# Patient Record
Sex: MALE | Race: WHITE | NOT HISPANIC OR LATINO | Employment: OTHER | URBAN - METROPOLITAN AREA
[De-identification: names, ages, dates, MRNs, and addresses within clinical notes are randomized per-mention and may not be internally consistent; named-entity substitution may affect disease eponyms.]

---

## 2023-09-12 ENCOUNTER — OFFICE VISIT (OUTPATIENT)
Dept: PRIMARY CARE | Facility: CLINIC | Age: 68
End: 2023-09-12
Payer: MEDICARE

## 2023-09-12 VITALS
HEART RATE: 87 BPM | TEMPERATURE: 97.3 F | SYSTOLIC BLOOD PRESSURE: 146 MMHG | HEIGHT: 63 IN | BODY MASS INDEX: 21.09 KG/M2 | DIASTOLIC BLOOD PRESSURE: 77 MMHG | WEIGHT: 119 LBS

## 2023-09-12 DIAGNOSIS — L89.312 PRESSURE INJURY OF RIGHT BUTTOCK, STAGE 2 (MULTI): Primary | ICD-10-CM

## 2023-09-12 PROCEDURE — 99213 OFFICE O/P EST LOW 20 MIN: CPT | Performed by: INTERNAL MEDICINE

## 2023-09-12 PROCEDURE — 1126F AMNT PAIN NOTED NONE PRSNT: CPT | Performed by: INTERNAL MEDICINE

## 2023-09-12 NOTE — PROGRESS NOTES
"Subjective   Raghu Gonzalez is a 68 y.o. male who presents for No chief complaint on file..  HPI  Patient presents accompanied by his wife with concerns about a possible bedsore.  He had been having discomfort in the gluteal cleft for several weeks, pain when sitting down or changing positions.  Finally told his wife and she saw it looked like a sore on the right side of the buttocks about 3 days ago.  Since then he has tried using a special cushion to keep changing positions and offloading and that has seemed to help with the discomfort.  He has not had any blood or drainage, no systemic symptoms of infection.  Objective   /77   Pulse 87   Temp 36.3 °C (97.3 °F)   Ht 1.6 m (5' 3\")   Wt 54 kg (119 lb)   BMI 21.08 kg/m²    Physical Exam  NAD  On the right side of the gluteal cleft more superiorly there is a ovoid ulcer, stage II, proximally 1-1/2 x 0.5 cm.  No surrounding erythema induration warmth or drainage.      Assessment/Plan   Problem List Items Addressed This Visit    None  Visit Diagnoses       Pressure injury of right buttock, stage 2 (CMS/Formerly [facility])    -  Primary          -Protection and offloading       Hair George MD   "

## 2023-09-12 NOTE — PATIENT INSTRUCTIONS
Wash twice daily with soap and water and whenever the area is soiled.  Apply an ointment such as A&D after washing.  If needed, use an occlusive bandage like Mepilex to protect the area.  Change positions often.  Call if there are any concerns.

## 2023-10-19 ENCOUNTER — TELEPHONE (OUTPATIENT)
Dept: NEUROLOGY | Facility: CLINIC | Age: 68
End: 2023-10-19
Payer: MEDICARE

## 2023-10-19 DIAGNOSIS — G47.19 EXCESSIVE DAYTIME SLEEPINESS: Primary | ICD-10-CM

## 2023-10-19 RX ORDER — LORATADINE 10 MG/1
TABLET ORAL
COMMUNITY

## 2023-10-19 RX ORDER — SIMVASTATIN 40 MG/1
40 TABLET, FILM COATED ORAL EVERY OTHER DAY
COMMUNITY
End: 2023-11-27 | Stop reason: DRUGHIGH

## 2023-10-19 RX ORDER — IBUPROFEN 600 MG/1
TABLET ORAL 3 TIMES DAILY PRN
COMMUNITY

## 2023-10-19 RX ORDER — SIMVASTATIN 10 MG/1
TABLET, FILM COATED ORAL
COMMUNITY
End: 2023-11-27 | Stop reason: WASHOUT

## 2023-10-19 RX ORDER — BUPROPION HYDROCHLORIDE 300 MG/1
300 TABLET ORAL DAILY
COMMUNITY
End: 2023-11-30 | Stop reason: SDUPTHER

## 2023-10-19 RX ORDER — ATENOLOL 50 MG/1
50 TABLET ORAL DAILY
COMMUNITY
End: 2023-11-27 | Stop reason: SDUPTHER

## 2023-10-19 RX ORDER — CARBIDOPA AND LEVODOPA 25; 100 MG/1; MG/1
1 TABLET, EXTENDED RELEASE ORAL 4 TIMES DAILY
COMMUNITY

## 2023-10-19 RX ORDER — BISACODYL 5 MG
1 TABLET, DELAYED RELEASE (ENTERIC COATED) ORAL DAILY
COMMUNITY
Start: 2022-10-07

## 2023-10-19 RX ORDER — HYDROXYZINE PAMOATE 25 MG/1
25 CAPSULE ORAL AS NEEDED
COMMUNITY
End: 2023-11-30 | Stop reason: ALTCHOICE

## 2023-10-19 RX ORDER — CARBIDOPA AND LEVODOPA 25; 100 MG/1; MG/1
TABLET ORAL 4 TIMES DAILY
COMMUNITY
Start: 2022-03-08 | End: 2023-11-30 | Stop reason: ALTCHOICE

## 2023-10-19 RX ORDER — TRAZODONE HYDROCHLORIDE 50 MG/1
50 TABLET ORAL NIGHTLY PRN
COMMUNITY
End: 2023-11-30 | Stop reason: SDUPTHER

## 2023-10-19 RX ORDER — ROPINIROLE 8 MG/1
8 TABLET, EXTENDED RELEASE ORAL 2 TIMES DAILY
COMMUNITY
End: 2023-11-27 | Stop reason: WASHOUT

## 2023-10-19 RX ORDER — BUPROPION HYDROCHLORIDE 300 MG/1
300 TABLET ORAL
COMMUNITY
End: 2023-11-30 | Stop reason: SDUPTHER

## 2023-10-19 RX ORDER — SERTRALINE HYDROCHLORIDE 100 MG/1
100 TABLET, FILM COATED ORAL
COMMUNITY
End: 2023-11-30 | Stop reason: SDUPTHER

## 2023-10-19 RX ORDER — MODAFINIL 100 MG/1
200 TABLET ORAL DAILY
COMMUNITY
End: 2023-10-19 | Stop reason: SDUPTHER

## 2023-10-19 RX ORDER — MODAFINIL 100 MG/1
100 TABLET ORAL 2 TIMES DAILY
Qty: 60 TABLET | Refills: 2 | Status: SHIPPED | OUTPATIENT
Start: 2023-10-19 | End: 2023-11-22 | Stop reason: SDUPTHER

## 2023-10-19 NOTE — TELEPHONE ENCOUNTER
Patient called requesting a refill of Modafinil 100 mg sent to [facility] on [address]. Thank you

## 2023-11-22 DIAGNOSIS — G47.19 EXCESSIVE DAYTIME SLEEPINESS: ICD-10-CM

## 2023-11-27 ENCOUNTER — OFFICE VISIT (OUTPATIENT)
Dept: PRIMARY CARE | Facility: CLINIC | Age: 68
End: 2023-11-27
Payer: MEDICARE

## 2023-11-27 VITALS
WEIGHT: 118 LBS | DIASTOLIC BLOOD PRESSURE: 79 MMHG | OXYGEN SATURATION: 96 % | TEMPERATURE: 97.3 F | SYSTOLIC BLOOD PRESSURE: 137 MMHG | HEART RATE: 87 BPM | BODY MASS INDEX: 20.91 KG/M2 | HEIGHT: 63 IN

## 2023-11-27 DIAGNOSIS — Z87.891 PERSONAL HISTORY OF TOBACCO USE, PRESENTING HAZARDS TO HEALTH: ICD-10-CM

## 2023-11-27 DIAGNOSIS — E46 PROTEIN-CALORIE MALNUTRITION, UNSPECIFIED SEVERITY (MULTI): ICD-10-CM

## 2023-11-27 DIAGNOSIS — I70.203 ATHEROSCLEROSIS OF NATIVE ARTERY OF BOTH LOWER EXTREMITIES, WITH UNSPECIFIED PRESENCE OF CLINICAL MANIFESTATION (CMS-HCC): ICD-10-CM

## 2023-11-27 DIAGNOSIS — Z12.11 ENCOUNTER FOR SCREENING FOR MALIGNANT NEOPLASM OF COLON: Primary | ICD-10-CM

## 2023-11-27 DIAGNOSIS — Z00.00 ROUTINE GENERAL MEDICAL EXAMINATION AT HEALTH CARE FACILITY: ICD-10-CM

## 2023-11-27 DIAGNOSIS — J30.89 NON-SEASONAL ALLERGIC RHINITIS, UNSPECIFIED TRIGGER: ICD-10-CM

## 2023-11-27 DIAGNOSIS — Z12.2 ENCOUNTER FOR SCREENING FOR MALIGNANT NEOPLASM OF LUNG: ICD-10-CM

## 2023-11-27 DIAGNOSIS — Z12.5 PROSTATE CANCER SCREENING: ICD-10-CM

## 2023-11-27 DIAGNOSIS — I10 PRIMARY HYPERTENSION: Primary | ICD-10-CM

## 2023-11-27 DIAGNOSIS — Z13.6 SCREENING FOR AAA (ABDOMINAL AORTIC ANEURYSM): ICD-10-CM

## 2023-11-27 DIAGNOSIS — E78.2 COMBINED HYPERLIPIDEMIA: ICD-10-CM

## 2023-11-27 DIAGNOSIS — I10 BENIGN ESSENTIAL HTN: ICD-10-CM

## 2023-11-27 PROBLEM — K40.90: Status: ACTIVE | Noted: 2023-11-27

## 2023-11-27 PROBLEM — G20.B1 DYSKINESIA DUE TO PARKINSON'S DISEASE (MULTI): Status: ACTIVE | Noted: 2023-11-27

## 2023-11-27 PROBLEM — R35.1 BENIGN PROSTATIC HYPERPLASIA WITH NOCTURIA: Status: ACTIVE | Noted: 2023-11-27

## 2023-11-27 PROBLEM — N40.1 BENIGN PROSTATIC HYPERPLASIA WITH NOCTURIA: Status: ACTIVE | Noted: 2023-11-27

## 2023-11-27 PROBLEM — G47.10 HYPERSOMNIA, ORGANIC: Status: ACTIVE | Noted: 2023-11-27

## 2023-11-27 PROBLEM — F41.8 DEPRESSION WITH ANXIETY: Status: ACTIVE | Noted: 2023-11-27

## 2023-11-27 LAB
ALBUMIN SERPL BCP-MCNC: 4.3 G/DL (ref 3.4–5)
ALP SERPL-CCNC: 66 U/L (ref 33–136)
ALT SERPL W P-5'-P-CCNC: 6 U/L (ref 10–52)
ANION GAP SERPL CALC-SCNC: 11 MMOL/L (ref 10–20)
AST SERPL W P-5'-P-CCNC: 19 U/L (ref 9–39)
BILIRUB SERPL-MCNC: 0.6 MG/DL (ref 0–1.2)
BUN SERPL-MCNC: 25 MG/DL (ref 6–23)
CALCIUM SERPL-MCNC: 9.3 MG/DL (ref 8.6–10.6)
CHLORIDE SERPL-SCNC: 104 MMOL/L (ref 98–107)
CHOLEST SERPL-MCNC: 166 MG/DL (ref 0–199)
CHOLESTEROL/HDL RATIO: 3.1
CO2 SERPL-SCNC: 30 MMOL/L (ref 21–32)
CREAT SERPL-MCNC: 0.94 MG/DL (ref 0.5–1.3)
GFR SERPL CREATININE-BSD FRML MDRD: 87 ML/MIN/1.73M*2
GLUCOSE SERPL-MCNC: 100 MG/DL (ref 74–99)
HDLC SERPL-MCNC: 53.9 MG/DL
LDLC SERPL CALC-MCNC: 92 MG/DL
NON HDL CHOLESTEROL: 112 MG/DL (ref 0–149)
POTASSIUM SERPL-SCNC: 4.4 MMOL/L (ref 3.5–5.3)
PROT SERPL-MCNC: 6.2 G/DL (ref 6.4–8.2)
PSA SERPL-MCNC: 1.21 NG/ML
SODIUM SERPL-SCNC: 141 MMOL/L (ref 136–145)
TRIGL SERPL-MCNC: 99 MG/DL (ref 0–149)
VLDL: 20 MG/DL (ref 0–40)

## 2023-11-27 PROCEDURE — 1036F TOBACCO NON-USER: CPT | Performed by: INTERNAL MEDICINE

## 2023-11-27 PROCEDURE — 80061 LIPID PANEL: CPT

## 2023-11-27 PROCEDURE — 36415 COLL VENOUS BLD VENIPUNCTURE: CPT

## 2023-11-27 PROCEDURE — 3078F DIAST BP <80 MM HG: CPT | Performed by: INTERNAL MEDICINE

## 2023-11-27 PROCEDURE — 1126F AMNT PAIN NOTED NONE PRSNT: CPT | Performed by: INTERNAL MEDICINE

## 2023-11-27 PROCEDURE — 1159F MED LIST DOCD IN RCRD: CPT | Performed by: INTERNAL MEDICINE

## 2023-11-27 PROCEDURE — G0103 PSA SCREENING: HCPCS

## 2023-11-27 PROCEDURE — 99214 OFFICE O/P EST MOD 30 MIN: CPT | Performed by: INTERNAL MEDICINE

## 2023-11-27 PROCEDURE — G0439 PPPS, SUBSEQ VISIT: HCPCS | Performed by: INTERNAL MEDICINE

## 2023-11-27 PROCEDURE — 1170F FXNL STATUS ASSESSED: CPT | Performed by: INTERNAL MEDICINE

## 2023-11-27 PROCEDURE — 1160F RVW MEDS BY RX/DR IN RCRD: CPT | Performed by: INTERNAL MEDICINE

## 2023-11-27 PROCEDURE — 80053 COMPREHEN METABOLIC PANEL: CPT

## 2023-11-27 PROCEDURE — 3075F SYST BP GE 130 - 139MM HG: CPT | Performed by: INTERNAL MEDICINE

## 2023-11-27 RX ORDER — FLUTICASONE PROPIONATE 50 MCG
1 SPRAY, SUSPENSION (ML) NASAL DAILY
Qty: 16 G | Refills: 11 | Status: SHIPPED | OUTPATIENT
Start: 2023-11-27 | End: 2024-11-26

## 2023-11-27 RX ORDER — MODAFINIL 100 MG/1
100 TABLET ORAL 2 TIMES DAILY
Qty: 60 TABLET | Refills: 2 | Status: SHIPPED | OUTPATIENT
Start: 2023-11-27

## 2023-11-27 RX ORDER — ATENOLOL 50 MG/1
50 TABLET ORAL DAILY
Qty: 90 TABLET | Refills: 3 | Status: SHIPPED | OUTPATIENT
Start: 2023-11-27

## 2023-11-27 ASSESSMENT — ENCOUNTER SYMPTOMS
DIARRHEA: 0
CONSTIPATION: 0
OCCASIONAL FEELINGS OF UNSTEADINESS: 1
PALPITATIONS: 0
DEPRESSION: 0
ACTIVITY CHANGE: 0
SHORTNESS OF BREATH: 0
LOSS OF SENSATION IN FEET: 0
FATIGUE: 0
FREQUENCY: 0

## 2023-11-27 ASSESSMENT — PATIENT HEALTH QUESTIONNAIRE - PHQ9
1. LITTLE INTEREST OR PLEASURE IN DOING THINGS: NOT AT ALL
2. FEELING DOWN, DEPRESSED OR HOPELESS: NOT AT ALL
1. LITTLE INTEREST OR PLEASURE IN DOING THINGS: NOT AT ALL
SUM OF ALL RESPONSES TO PHQ9 QUESTIONS 1 AND 2: 0
2. FEELING DOWN, DEPRESSED OR HOPELESS: NOT AT ALL
SUM OF ALL RESPONSES TO PHQ9 QUESTIONS 1 AND 2: 0

## 2023-11-27 ASSESSMENT — ACTIVITIES OF DAILY LIVING (ADL)
DRESSING: INDEPENDENT
GROCERY_SHOPPING: INDEPENDENT
TAKING_MEDICATION: INDEPENDENT
BATHING: INDEPENDENT
MANAGING_FINANCES: INDEPENDENT
DOING_HOUSEWORK: INDEPENDENT

## 2023-11-27 NOTE — PROGRESS NOTES
Subjective   Reason for Visit: Raghu Gonzalez is an 68 y.o. male here for a Medicare Wellness visit.     HPI  Past medical history most significant for Parkinson's disease, followed by Dr. Castillo. He also has a history of well-controlled hypertension, borderline hyperlipidemia, and right-sided Bell's palsy.    Interim:  No acute concerns today.  Patient says he is stillsSlowly losing weight. Feels like 115-120 is a good range. Not concerned. Occasionally drinks ensure or boost but says he think he should drink more. Eats three small meals a day. But does not have much appetite  Denies reflux, constipation, diarrhea, nausea, vomiting.   Endorses some chronic runny nose, takes Claritin as needed (not every day) and feels it helps    Patient reports he currently Takes simvastatin 40 mg twice a week on Monday and Thursday instead of as previously prescribed as daily. States sometime in the past year or so had cholesterol panel that was reportedly low so he started taking his 40 mg only twice per week instead of every day.    Saw neurology 05/2023. Still having tremors, rigidity and bradykinesia. Recommended PT but not interested at this time. and neurology managing Parkinson's meds, increased carbidopa/levodopa and modafinil. Riding exercise stationary bike and weights     Has Depression and anxiety. Following with Dr. Nixon. Currently taking wellbutrin, sertraline and trazadone as needed for sleep    Hernia repair this past summer. Reports no issues since then.    Copied Forward for Reference:    Has had weight loss (looking back it has been slowly trending down, was 150s in 2017 now 118, in particular 12lb in the past year), he has a poor appetite over the past year, 2 meals per day. Weight has improved slightly compared to last visit 119 --> 123lb. No vomiting, no nausea, no dysphagia, early satiety but hard to say because he is not hungry to start with. No night sweats, cough, shortness of breath. Reports his  "mood is good overall.     He also has a history of depression and anxiety as well as a history of dependence on tramadol, seeing psychiatrist Dr. Jolly, has been stable on medications of sertraline and wellbutrin. Feels well, moods have been good, very grateful for circumstances.     He is , no children. Retired from social work at a hospice. Feeling much less anxiety and stress. He is a former smoker quit 01/13/1997, greater than 20-pack-year smoker. Rare EtOH, at most one drink at a time, maybe monthly. He does exercise, stationary bycle, walkinghas gotten more difficult.  Endorses healthy diet.   No longer driving    Patient Care Team:  Hair George MD as PCP - General  Hair George MD as PCP - [facility]     Review of Systems   Constitutional:  Negative for activity change and fatigue.   HENT:  Positive for congestion.    Respiratory:  Negative for shortness of breath.    Cardiovascular:  Negative for chest pain and palpitations.   Gastrointestinal:  Negative for constipation and diarrhea.   Genitourinary:  Negative for frequency.       Objective   Vitals:  /79   Pulse 87   Temp 36.3 °C (97.3 °F)   Ht 1.6 m (5' 3\")   Wt 53.5 kg (118 lb)   SpO2 96%   BMI 20.90 kg/m²       Physical Exam  Constitutional:       Appearance: He is not toxic-appearing or diaphoretic.   HENT:      Head: Normocephalic and atraumatic.      Nose: Nose normal.      Mouth/Throat:      Mouth: Mucous membranes are moist.      Pharynx: Oropharynx is clear.   Eyes:      Extraocular Movements: Extraocular movements intact.      Conjunctiva/sclera: Conjunctivae normal.      Pupils: Pupils are equal, round, and reactive to light.   Cardiovascular:      Rate and Rhythm: Normal rate and regular rhythm.      Pulses: Normal pulses.      Heart sounds: Normal heart sounds.   Pulmonary:      Effort: Pulmonary effort is normal.      Breath sounds: Normal breath sounds.   Abdominal:      General: Abdomen is " flat. Bowel sounds are normal.   Skin:     General: Skin is warm and dry.   Neurological:      General: No focal deficit present.      Mental Status: He is alert and oriented to person, place, and time. Mental status is at baseline.      Motor: Weakness present.      Gait: Gait abnormal.      Comments: Mild resting tremor and bradykinesia   Psychiatric:         Mood and Affect: Mood normal.         Behavior: Behavior normal.         Assessment/Plan   Problem List Items Addressed This Visit       Benign essential HTN    Relevant Orders    Lipid panel    Comprehensive metabolic panel    Combined hyperlipidemia    Relevant Orders    Lipid panel    Comprehensive metabolic panel     Other Visit Diagnoses       Encounter for screening for malignant neoplasm of colon    -  Primary    Prostate cancer screening        Relevant Orders    Prostate Spec.Ag,Screen    Encounter for screening for malignant neoplasm of lung        Non-seasonal allergic rhinitis, unspecified trigger        Relevant Medications    fluticasone (Flonase) 50 mcg/actuation nasal spray    Routine general medical examination at health care facility        Personal history of tobacco use, presenting hazards to health        Relevant Orders    CT lung screening low dose    Screening for AAA (abdominal aortic aneurysm)        Relevant Orders    Vascular US abdominal aorta anuerysm AAA screening          68-year-old gentleman presenting for AWV. PMHx of Parkinson's, HTN, HLD, BPH     Chronic rhinitis  - continue Claritin   [  ]  Flonase as needed when symptoms flare    Hx Unintentional weight loss: stabilized/improved, CTM  - patient opts to try boost supplements for now, drinks occasionally  - In past considered discussed upper endoscopy and repeat labs as the next steps, currently no red flag symptoms.     Has Depression and anxiety. Following with Dr. Nixon. Currently taking wellbutrin 300 mg daily, sertraline 100 mg daily and trazodone 50 mg as needed  for sleep.  - continue follow up with geriatric psych    Hypertension and hyperlipidemia: elevated BP recently, mildly elevated office today.  - continue atenolol 50 mg daily  - pt reports taking simvastatin 40 mg only 2x per week  [  ] Repeat lipid panel today, if > 100 consider switching to atorvastatin but if low can switch to simvastatin at lower dose.     BPH with nocturia/PSA: symptoms actually decreased at night; PSA was ~1ng/mL in 2021  - Reports minimal symptoms, wakes up about 2x per night.  - PSA today    L Direct inguinal hernia, reducible  - repaired in 2022      Health maintenance  -Last colonoscopy: Cologuard normal in NOV 2020. Repeat due this year, ordered.   -Smoking history: Greater than 20-pack-year, quit 1997, ordered low dose CT and AAA screening today  -Counseled regarding diet and exercise  -Immunizations: Continue annual influenza, received COVID, recommend Shingrix (not interested)  -Followup annually and as needed     Patient seen and staffed with Dr. George.    Marta Sher MD

## 2023-11-27 NOTE — PROGRESS NOTES
I saw and evaluated the patient. I personally obtained the key and critical portions of the history and physical exam or was physically present for key and critical portions performed by the resident/fellow. I reviewed the resident/fellow's documentation and discussed the patient with the resident/fellow. I agree with the resident/fellow's medical decision making as documented in the note.    Hair George MD

## 2023-11-30 ENCOUNTER — TELEMEDICINE (OUTPATIENT)
Dept: BEHAVIORAL HEALTH | Facility: CLINIC | Age: 68
End: 2023-11-30
Payer: MEDICARE

## 2023-11-30 DIAGNOSIS — F41.8 DEPRESSION WITH ANXIETY: ICD-10-CM

## 2023-11-30 PROCEDURE — 99213 OFFICE O/P EST LOW 20 MIN: CPT | Performed by: PSYCHIATRY & NEUROLOGY

## 2023-11-30 RX ORDER — TRAZODONE HYDROCHLORIDE 50 MG/1
50 TABLET ORAL NIGHTLY PRN
Qty: 90 TABLET | Refills: 3 | Status: SHIPPED | OUTPATIENT
Start: 2023-11-30 | End: 2024-11-29

## 2023-11-30 RX ORDER — BUPROPION HYDROCHLORIDE 300 MG/1
300 TABLET ORAL DAILY
Qty: 90 TABLET | Refills: 3 | Status: SHIPPED | OUTPATIENT
Start: 2023-11-30 | End: 2024-11-29

## 2023-11-30 RX ORDER — SERTRALINE HYDROCHLORIDE 100 MG/1
100 TABLET, FILM COATED ORAL
Qty: 90 TABLET | Refills: 3 | Status: SHIPPED | OUTPATIENT
Start: 2023-11-30 | End: 2024-11-29

## 2023-11-30 NOTE — PATIENT INSTRUCTIONS
- Continue wellbutrin XL 300mg daily and sertraline 100mg daily.   - Continue trazodone 50mg as needed for sleep .  - Healthy lifestyle encouraged.   - Follow up in 12 months.   - Contact via [employer] or call sooner ([phone]) in case of any questions or concerns.  - Call [phone] for mental health crisis or suicidal thoughts, or call 911 or go to the nearest emergency room for emergencies.

## 2023-11-30 NOTE — PROGRESS NOTES
"Outpatient Psychiatry Follow up visit    Mr. Raghu Gonzalez is a 68-year-old man with a history of anxiety, depression and Parkinson's disease. Follow up done virtually (audio+video) today.     Subjective:      He says he is feeling \"pretty good.\"     He says the Parkinson's disease has not been progressing much.       Sleep - \"better.\" He says he gets between 6 and 7 hours at night. Takes trazodone 50mg at bedtime. Does feel somewhat tired and may take an hour and 15 minutes nap in the morning and 45 to 60 minutes in the afternoon.   Appetite - \"not too good.\" Gets filled quickly; has lost some weight over time.   Energy - gets tired easily but still exercises regularly.   Concentration - pretty good.   No hopelessness or worthlessness.   Denies any death wishes, suicidal thoughts, intent or plans.      Denies excessive worry. No panic attacks. He used to be anxious about driving but having stopped driving has provided reliefs.      He still only goes out once or twice a week; he says he feels much more comfortable at home. He says he takes care of things around the house and does some projects.      No manic or hypomanic episodes.      Denies having any visual illusions or hallucinations recently. No paranoia or delusional beliefs.      No compulsive or impulsive behaviors. Says he eats a fair amount of sweets but trying to cut back.      Feels memory is stable.   Does not drive. Wife is driving.   Manages finances.      Current medications:   Sertraline 100mg once daily  Bupropion XL 300mg once daily  Trazodone 25 to 50mg at bedtime.    Also takes modafinil 100mg as needed for daytime sleepiness.       Psychiatric Review Of Systems:   As above.       Medical Review Of Systems:    Pertinent items are noted in HPI.        Record Review: brief       Mental Status Evaluation:  MSE:  Appearance: Fair grooming.   Behavior/Attitude: Cooperative. Pleasant.   Motor: Psychomotor activity in average range. No abnormal " "involuntary movements visible during video visit.   Speech: Regular in rate, tone and volume. No pressure.  Mood: \"pretty good.\"  Affect: Congruent to stated mood. Mobilized appropriately. Normal range.   Thought process: Goal-directed. Linear. Organized.  Thought content: No paranoia, delusion or ideas of reference elicited. No hallucinations in auditory, visual or other sensory modalities.   Suicidal ideation: denied.  Homicidal ideation: denied.   Insight: Fair.  Judgment: Fair.  Orientation: oriented correctly to time, place and person.  Recent and remote memory: intact based on recall of recent and remote autobiographical memories.  Attention/concentration: intact during visit  Language: No aphasia or paraphasic errors during conversation   Fund of knowledge: Average    Assessment/Plan   Assessment:   Mr. Raghu Gonzalez is a 68-year-old man with a history of anxiety, depression and Parkinson's disease. Follow up done virtually today.      11/30/23: Anxiety and depression are stable on current regimen.      Diagnosis:   Other specified depressive disorder  Other specified anxiety disorder      Treatment Plan/Recommendations:   - Continue wellbutrin XL 300mg daily and sertraline 100mg daily.   - Continue trazodone 50mg as needed for sleep .  - Healthy lifestyle encouraged.   - Follow up in 12 months.   - Call sooner if needed.      Review with patient: Treatment plan reviewed with the patient.    Kaitlin Nixon MD  "

## 2024-02-13 ENCOUNTER — APPOINTMENT (OUTPATIENT)
Dept: NEUROLOGY | Facility: HOSPITAL | Age: 69
End: 2024-02-13
Payer: MEDICARE

## 2024-02-22 ENCOUNTER — APPOINTMENT (OUTPATIENT)
Dept: NEUROLOGY | Facility: HOSPITAL | Age: 69
End: 2024-02-22
Payer: MEDICARE

## 2024-02-27 ENCOUNTER — TELEPHONE (OUTPATIENT)
Dept: NEUROLOGY | Facility: CLINIC | Age: 69
End: 2024-02-27
Payer: MEDICARE

## 2024-02-28 ENCOUNTER — PATIENT MESSAGE (OUTPATIENT)
Dept: PRIMARY CARE | Facility: CLINIC | Age: 69
End: 2024-02-28

## 2024-02-28 ENCOUNTER — APPOINTMENT (OUTPATIENT)
Dept: NEUROLOGY | Facility: CLINIC | Age: 69
End: 2024-02-28
Payer: MEDICARE

## 2024-02-28 DIAGNOSIS — U07.1 COVID-19: Primary | ICD-10-CM

## 2024-06-20 ENCOUNTER — PATIENT MESSAGE (OUTPATIENT)
Dept: PRIMARY CARE | Facility: CLINIC | Age: 69
End: 2024-06-20
Payer: MEDICARE

## 2024-06-20 DIAGNOSIS — E78.2 COMBINED HYPERLIPIDEMIA: Primary | ICD-10-CM

## 2024-06-21 RX ORDER — SIMVASTATIN 10 MG/1
10 TABLET, FILM COATED ORAL NIGHTLY
Qty: 100 TABLET | Refills: 3 | Status: SHIPPED | OUTPATIENT
Start: 2024-06-21 | End: 2025-07-26

## 2024-06-27 ENCOUNTER — OFFICE VISIT (OUTPATIENT)
Dept: NEUROLOGY | Facility: HOSPITAL | Age: 69
End: 2024-06-27
Payer: MEDICARE

## 2024-06-27 VITALS
RESPIRATION RATE: 18 BRPM | BODY MASS INDEX: 20.2 KG/M2 | SYSTOLIC BLOOD PRESSURE: 143 MMHG | DIASTOLIC BLOOD PRESSURE: 83 MMHG | HEIGHT: 63 IN | WEIGHT: 114 LBS | TEMPERATURE: 96 F | HEART RATE: 84 BPM

## 2024-06-27 DIAGNOSIS — G20.A1 PARKINSON'S DISEASE WITHOUT DYSKINESIA OR FLUCTUATING MANIFESTATIONS (MULTI): Primary | ICD-10-CM

## 2024-06-27 PROCEDURE — 99214 OFFICE O/P EST MOD 30 MIN: CPT | Performed by: PSYCHIATRY & NEUROLOGY

## 2024-06-27 PROCEDURE — 3079F DIAST BP 80-89 MM HG: CPT | Performed by: PSYCHIATRY & NEUROLOGY

## 2024-06-27 PROCEDURE — 1159F MED LIST DOCD IN RCRD: CPT | Performed by: PSYCHIATRY & NEUROLOGY

## 2024-06-27 PROCEDURE — 1126F AMNT PAIN NOTED NONE PRSNT: CPT | Performed by: PSYCHIATRY & NEUROLOGY

## 2024-06-27 PROCEDURE — 3077F SYST BP >= 140 MM HG: CPT | Performed by: PSYCHIATRY & NEUROLOGY

## 2024-06-27 PROCEDURE — 1160F RVW MEDS BY RX/DR IN RCRD: CPT | Performed by: PSYCHIATRY & NEUROLOGY

## 2024-06-27 RX ORDER — ROTIGOTINE 2 MG/24H
1 PATCH, EXTENDED RELEASE TRANSDERMAL DAILY
Qty: 30 PATCH | Refills: 2 | Status: SHIPPED | OUTPATIENT
Start: 2024-06-27 | End: 2025-06-27

## 2024-06-27 RX ORDER — CARBIDOPA AND LEVODOPA 25; 100 MG/1; MG/1
TABLET ORAL
Qty: 5 TABLET | Refills: 0 | Status: SHIPPED | OUTPATIENT
Start: 2024-06-27

## 2024-06-27 ASSESSMENT — UNIFIED PARKINSONS DISEASE RATING SCALE (UPDRS)
AMPLITUDE_LLE: 0
SPONTANEITY_OF_MOVEMENT: 2
LEG_AGILITY_RIGHT: 2
RIGIDITY_RUE: 2
POSTURE: 2
PRONATION_SUPINATION_LEFT: 2
RIGIDITY_LUE: 2
FREEZING_GAIT: 0
KINETIC_TREMOR_LEFTHAND: 1
DYSKINESIAS_PRESENT: NO
CLINICAL_STATE: ON
TOETAPPING_LEFT: 3
TOTAL_SCORE: 56
AMPLITUDE_RLE: 0
PARKINSONS_MEDS: YES
CHAIR_RISING_SCALE: 2
HANDMOVEMENTS_RIGHT: 2
RIGIDITY_RLE: 3
RIGIDITY_LLE: 3
TOETAPPING_RIGHT: 2
FACIAL_EXPRESSION: 2
AMPLITUDE_RUE: 2
PRONATION_SUPINATION_RIGHT: 2
CONSTANCY_TREMOR_ATREST: 3
AMPLITUDE_LIP_JAW: 1
GAIT: 1
FINGER_TAPPING_RIGHT: 2
KINETIC_TREMOR_RIGHTHAND: 1
POSTURAL_TREMOR_RIGHTHAND: 1
FINGER_TAPPING_LEFT: 2
RIGIDITY_NECK: 2
POSTURAL_STABILITY: 1
POSTURAL_TREMOR_LEFTHAND: 1
AMPLITUDE_LUE: 1
LEVODOPA: YES
SPEECH: 2
LEG_AGILITY_LEFT: 2

## 2024-06-27 ASSESSMENT — PAIN SCALES - GENERAL: PAINLEVEL: 0-NO PAIN

## 2024-06-27 NOTE — PATIENT INSTRUCTIONS
It was nice to see you today.   1, continue current dose of Carbidopa/levodopa  2. Lets schedule you for a levodopa response test as well as DBS educational visit  3. Let;s try Neupro 2 mg/24 hours. Watch for rash, lightheadedness when standing, impulse control disorder and sleep attacks.   4. Try to exercise as much as you can.   5. We will call you.   6. Speech therapy.

## 2024-06-27 NOTE — LETTER
June 28, 2024     Hair George MD  [id number] [address]  [place] [address]    Patient: Raghu Gonzalez   YOB: 1955   Date of Visit: 6/27/2024       Dear Dr. Hair George MD:    Thank you for referring Raghu Gonzalez to me for evaluation. Below are my notes for this consultation.  If you have questions, please do not hesitate to call me. I look forward to following your patient along with you.       Sincerely,     Nel Castillo MD      CC: No Recipients  ______________________________________________________________________________________    Subjective    Raghu Gonzalez is a right handed  68 y.o. year old male who presents with No chief complaint on file.. Patient is accompanied by: spouse  Visit type: follow up visit     Current issues: more tremors of R side of body as well as of the jaw. Has joined [employer], goes 1-2 per week. He likes it but exhausted after. Did one of the speaking loud classes, but a strain on his vocal cords.    Current PD meds:  Carbidopa/levodopa  mg ER 1 tab QID (8 am -12 pm -4 pm -8 pm) - developed muscle tightness at 2 tabs QID, he feels that movements are painful if he increases it more.        Carbidopa/levodopa seems to be helping with his tremor but he does not have motor fluctuations.  If he takes doses too close together, he feels tightening of his muscles.         Review of Motor symptoms:  Tremor:  Location- R sided                 Rest/postural/action- rest, more so than prior  Stiffness/rigidity: only feels it if he takes to much of the C/L  Slowness:  Right sided mostly.   Trouble walking:  getting worse, he cannot go far without having to stop - he feels he cannot control his legs as well - he denies shuffling, he cannot stand for a long period of time, has to sit down after standing for 1 minute. Does experience some festination in tight spots, can;t slow down.   Freezing of gait:  denies  Balance problems:  feels off balance  Falls:   denies  Changes in speech:  soft and quieter.   Swallowing difficulties:  no issues.   Activities of daily living (buttoning clothes, bathing, cutting food, etc):  help w dressing makes it go smoother, he can still do it, but getting more difficult,     Review of Non-Motor symptoms:  Cognition:  Memory- good, no cognitive decline. 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                        Anxiety: at times, if change to routine or going to an event, stays home 95% of time.   Sleep disturbances including REM behavior disorder:okay, has to urinate 2-3 times a night, take a few naps. Any activity tires him out. Sleeps in a recliner, no RBD.   Sensory changes (ie, smell or taste):  denies  Gastrointestinal complaints/Constipation:  more so than has been, tries to eat fiber. BM every other day.   Urinary retention or frequency:  frequency, a few episodes of incontinence due to urgency  Positional lightheadedness and/or syncope:  denies  Excessive saliva/drooling:  denies  Fatigue:  yes.         Past medication trials:  Rytary was too costly  Amantadine caused loss of appetite and associated weight loss  IR C/L caused neck tightness like dyskinesias  Ropinirole and Pramipexole - developed dyskinesia on higher doses and he had balance issues and frequent falls.  Modafinil 200 mg- ineffective    Patient Active Problem List   Diagnosis   • Benign essential HTN   • Benign prostatic hyperplasia with nocturia   • Combined hyperlipidemia   • Depression with anxiety   • Direct inguinal hernia of left side   • Dyskinesia due to Parkinson's disease (Multi)   • Hypersomnia, organic   • Protein-calorie malnutrition, unspecified severity (Multi)   • Atherosclerosis of native artery of both lower extremities, with unspecified presence of clinical manifestation (CMS-HCC)      Past Medical History:   Diagnosis Date   •  Elevated prostate specific antigen (PSA)     Elevated prostate specific antigen (PSA)   • Olecranon bursitis, left elbow 11/17/2020    Olecranon bursitis of left elbow   • Other conditions influencing health status 02/19/2018    Tear of left supraspinatus tendon, subsequent encounter   • Personal history of other diseases of the circulatory system     History of hypertension   • Personal history of other diseases of the nervous system and sense organs 03/28/2017    History of Bell's palsy   • Personal history of other endocrine, nutritional and metabolic disease     History of hypercholesterolemia   • Personal history of other mental and behavioral disorders     History of depression      Past Surgical History:   Procedure Laterality Date   • MR HEAD ANGIO WO IV CONTRAST  3/14/2015    MR HEAD ANGIO WO IV CONTRAST 3/14/2015 [facility]   • MR NECK ANGIO WO IV CONTRAST  3/14/2015    MR NECK ANGIO WO IV CONTRAST 3/14/2015 [facility]   • OTHER SURGICAL HISTORY  03/03/2019    Rotator cuff repair      Social History     Socioeconomic History   • Marital status:      Spouse name: Not on file   • Number of children: Not on file   • Years of education: Not on file   • Highest education level: Not on file   Occupational History   • Not on file   Tobacco Use   • Smoking status: Former     Types: Cigarettes   • Smokeless tobacco: Never   Substance and Sexual Activity   • Alcohol use: Not Currently   • Drug use: Never   • Sexual activity: Not on file   Other Topics Concern   • Not on file   Social History Narrative   • Not on file     Social Determinants of Health     Financial Resource Strain: Not on file   Food Insecurity: Not on file   Transportation Needs: Not on file   Physical Activity: Not on file   Stress: Not on file   Social Connections: Not on file   Intimate Partner Violence: Not on file   Housing Stability: Not on file      No family history on file.              Review of Systems  All  other system have been reviewed and are negative for complaint.  Objective  Vitals:    24 1103   BP: 143/83   Pulse: 84   Resp: 18   Temp: 35.6 °C (96 °F)      Neurological Exam      MDS UPDRS 1st Score: Motor Examination  Is the patient on medication for treating the symptoms of Parkinson's Disease?: Yes  Patients receiving medication for treating the symptoms of Parkinson's Disease, jessica the patient's clinical state.: On  Is the patient on Levodopa?: Yes  Speech: 2  Facial Expression: 2  Rigidty Neck: 2  Rigidty RUE: 2  Rigidity - LUE: 2  Rigidity RLE: 3  Rigidity LLE: 3  Finger Tapping Right Hand: 2  Finger Tapping Left Hand: 2  Hand Movements- Right Hand: 2  Hand Movements- Left Hand: 2  Pronatiaon-Supination Movments - Right Hand: 2  Pronatiaon-Supination Movments Left Hand: 2  Toe Tapping Right Foot: 2  Toe Tapping - Left Foot: 3  Leg Agility - Right Le  Leg Agility - Left le  Arising from Chair: 2  Gait: 1  Freezing of Gait: 0  Postural Stability: 1  Posture: 2  Global Spontanteity of Movment ( Body Bradykinesia): 2  Postural Tremor - Right Hand: 1  Postural Tremor - Left hand: 1  Kinetic Tremor - Right hand: 1  Kinetic Tremor - Left hand: 1  Rest Tremor Amplitude - RUE: 2  Rest Tremor Amplitude - LUE: 1  Rest Tremor Amplitude - RLE: 0  Rest Tremor Amplitude - LLE: 0  Rest Tremor Amplitude - Lip/Jaw: 1  Constancy of Rest Tremor: 3  MDS UPDRS Total Score: 56  Were dyskinesias (chorea or dystonia) present during examination?: No                  TSH   Date Value Ref Range Status   2020 0.95 0.44 - 3.98 mIU/L Final     Comment:      TSH testing is performed using different testing    methodology at [facility] than at other    [facility]. Direct result comparisons should    only be made within the same method.       Folate   Date Value Ref Range Status   2020 16.0 >5.0 ng/mL Final     Comment:     Low           <3.4  Borderline 3.4-5.0  Normal        >5.0  .   Biotin  "interference may cause falsely elevated results.    Patients taking a Biotin dose of up to 5 mg/day should    refrain from taking Biotin for 24 hours before sample    collection. Providers may contact their local laboratory   for further information.             Assessment/Plan      Raghu Gonzalez is a 68 y.o. year old male here for PD follow up. He still has significant tremors, rigidity and bradykinesia and remains under treated.  Could not tolerate IR C/L only ER, and only low doses, not higher individual doses, nor more frequent. Could consider levodopa subcut infusion, but also started discussing possible benefits and risks of DBS.  We ill do a trial of neupro patch for now. He will be set up for an Levodopa response test, was given a small script of Levodopa IR and advised that he may have to tolerate some tightness when he has the LRT (main side effect from higher doses) so that we can determine if he is levodopa responsive.     We started discussing  the possible benefits of DBS, including improvement in tremor, rigidity, bradykinesia, motor fluctuations including dyskinesias,  as well as gait if implanted bilaterally. We discussed that DBS can be helpful in treating \"off period\" freezing, but that \"on -period\" freezing may not respond, or only temporarily, as the disease progresses. We discussed that DBS typically does not treat midline or axial symptoms such as speech, swallowing or balance impairment.The goal should not be to discontinue the medications fully, rather that dosing can be reduced, and to increase the interval between dosing and the predictability of the medication response.    We discussed possible risks including stroke and infection in the jose-operative period, but he will discuss these risks in more detail with our neurosurgeons.    We discussed that DBS is not a cure, but helps the motor symptoms, and we also discussed that it is an on going therapy with frequent visits, particularly " at initiation of therapy, and with a gradual taper off medication while the stimulation is being increased.     Plan:    1, continue current dose of Carbidopa/levodopa  2. Lets schedule you for a levodopa response test as well as DBS educational visit  3. Let;s try Neupro 2 mg/24 hours. Watch for rash, orthostasis,  impulse control disorder and sleep attacks.   4. Try to exercise as much as you can.   5. We will call you.   6. Speech therapy.

## 2024-06-27 NOTE — PROGRESS NOTES
Subjective     Raghu Gonzalez is a right handed  68 y.o. year old male who presents with No chief complaint on file.. Patient is accompanied by: spouse  Visit type: follow up visit     Current issues: more tremors of R side of body as well as of the jaw. Has joined [employer], goes 1-2 per week. He likes it but exhausted after. Did one of the speaking loud classes, but a strain on his vocal cords.    Current PD meds:  Carbidopa/levodopa  mg ER 1 tab QID (8 am -12 pm -4 pm -8 pm) - developed muscle tightness at 2 tabs QID, he feels that movements are painful if he increases it more.        Carbidopa/levodopa seems to be helping with his tremor but he does not have motor fluctuations.  If he takes doses too close together, he feels tightening of his muscles.         Review of Motor symptoms:  Tremor:  Location- R sided                 Rest/postural/action- rest, more so than prior  Stiffness/rigidity: only feels it if he takes to much of the C/L  Slowness:  Right sided mostly.   Trouble walking:  getting worse, he cannot go far without having to stop - he feels he cannot control his legs as well - he denies shuffling, he cannot stand for a long period of time, has to sit down after standing for 1 minute. Does experience some festination in tight spots, can;t slow down.   Freezing of gait:  denies  Balance problems:  feels off balance  Falls:  denies  Changes in speech:  soft and quieter.   Swallowing difficulties:  no issues.   Activities of daily living (buttoning clothes, bathing, cutting food, etc):  help w dressing makes it go smoother, he can still do it, but getting more difficult,     Review of Non-Motor symptoms:  Cognition:  Memory- good, no cognitive decline. 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                        Anxiety: at times, if change to routine or going to an event, stays  home 95% of time.   Sleep disturbances including REM behavior disorder:okay, has to urinate 2-3 times a night, take a few naps. Any activity tires him out. Sleeps in a recliner, no RBD.   Sensory changes (ie, smell or taste):  denies  Gastrointestinal complaints/Constipation:  more so than has been, tries to eat fiber. BM every other day.   Urinary retention or frequency:  frequency, a few episodes of incontinence due to urgency  Positional lightheadedness and/or syncope:  denies  Excessive saliva/drooling:  denies  Fatigue:  yes.         Past medication trials:  Rytary was too costly  Amantadine caused loss of appetite and associated weight loss  IR C/L caused neck tightness like dyskinesias  Ropinirole and Pramipexole - developed dyskinesia on higher doses and he had balance issues and frequent falls.  Modafinil 200 mg- ineffective    Patient Active Problem List   Diagnosis    Benign essential HTN    Benign prostatic hyperplasia with nocturia    Combined hyperlipidemia    Depression with anxiety    Direct inguinal hernia of left side    Dyskinesia due to Parkinson's disease (Multi)    Hypersomnia, organic    Protein-calorie malnutrition, unspecified severity (Multi)    Atherosclerosis of native artery of both lower extremities, with unspecified presence of clinical manifestation (CMS-HCC)      Past Medical History:   Diagnosis Date    Elevated prostate specific antigen (PSA)     Elevated prostate specific antigen (PSA)    Olecranon bursitis, left elbow 11/17/2020    Olecranon bursitis of left elbow    Other conditions influencing health status 02/19/2018    Tear of left supraspinatus tendon, subsequent encounter    Personal history of other diseases of the circulatory system     History of hypertension    Personal history of other diseases of the nervous system and sense organs 03/28/2017    History of Bell's palsy    Personal history of other endocrine, nutritional and metabolic disease     History of  hypercholesterolemia    Personal history of other mental and behavioral disorders     History of depression      Past Surgical History:   Procedure Laterality Date    MR HEAD ANGIO WO IV CONTRAST  3/14/2015    MR HEAD ANGIO WO IV CONTRAST 3/14/2015 [facility]    MR NECK ANGIO WO IV CONTRAST  3/14/2015    MR NECK ANGIO WO IV CONTRAST 3/14/2015 [facility]    OTHER SURGICAL HISTORY  03/03/2019    Rotator cuff repair      Social History     Socioeconomic History    Marital status:      Spouse name: Not on file    Number of children: Not on file    Years of education: Not on file    Highest education level: Not on file   Occupational History    Not on file   Tobacco Use    Smoking status: Former     Types: Cigarettes    Smokeless tobacco: Never   Substance and Sexual Activity    Alcohol use: Not Currently    Drug use: Never    Sexual activity: Not on file   Other Topics Concern    Not on file   Social History Narrative    Not on file     Social Determinants of Health     Financial Resource Strain: Not on file   Food Insecurity: Not on file   Transportation Needs: Not on file   Physical Activity: Not on file   Stress: Not on file   Social Connections: Not on file   Intimate Partner Violence: Not on file   Housing Stability: Not on file      No family history on file.              Review of Systems  All other system have been reviewed and are negative for complaint.  Objective   Vitals:    06/27/24 1103   BP: 143/83   Pulse: 84   Resp: 18   Temp: 35.6 °C (96 °F)      Neurological Exam      MDS UPDRS 1st Score: Motor Examination  Is the patient on medication for treating the symptoms of Parkinson's Disease?: Yes  Patients receiving medication for treating the symptoms of Parkinson's Disease, jessica the patient's clinical state.: On  Is the patient on Levodopa?: Yes  Speech: 2  Facial Expression: 2  Rigidty Neck: 2  Rigidty RUE: 2  Rigidity - LUE: 2  Rigidity RLE: 3  Rigidity LLE: 3  Finger Tapping  Right Hand: 2  Finger Tapping Left Hand: 2  Hand Movements- Right Hand: 2  Hand Movements- Left Hand: 2  Pronatiaon-Supination Movments - Right Hand: 2  Pronatiaon-Supination Movments Left Hand: 2  Toe Tapping Right Foot: 2  Toe Tapping - Left Foot: 3  Leg Agility - Right Le  Leg Agility - Left le  Arising from Chair: 2  Gait: 1  Freezing of Gait: 0  Postural Stability: 1  Posture: 2  Global Spontanteity of Movment ( Body Bradykinesia): 2  Postural Tremor - Right Hand: 1  Postural Tremor - Left hand: 1  Kinetic Tremor - Right hand: 1  Kinetic Tremor - Left hand: 1  Rest Tremor Amplitude - RUE: 2  Rest Tremor Amplitude - LUE: 1  Rest Tremor Amplitude - RLE: 0  Rest Tremor Amplitude - LLE: 0  Rest Tremor Amplitude - Lip/Jaw: 1  Constancy of Rest Tremor: 3  MDS UPDRS Total Score: 56  Were dyskinesias (chorea or dystonia) present during examination?: No                  TSH   Date Value Ref Range Status   2020 0.95 0.44 - 3.98 mIU/L Final     Comment:      TSH testing is performed using different testing    methodology at [facility] than at other    [facility]. Direct result comparisons should    only be made within the same method.       Folate   Date Value Ref Range Status   2020 16.0 >5.0 ng/mL Final     Comment:     Low           <3.4  Borderline 3.4-5.0  Normal        >5.0  .   Biotin interference may cause falsely elevated results.    Patients taking a Biotin dose of up to 5 mg/day should    refrain from taking Biotin for 24 hours before sample    collection. Providers may contact their local laboratory   for further information.             Assessment/Plan       Raghu Gonzalez is a 68 y.o. year old male here for PD follow up. He still has significant tremors, rigidity and bradykinesia and remains under treated.  Could not tolerate IR C/L only ER, and only low doses, not higher individual doses, nor more frequent. Could consider levodopa subcut infusion, but also started  "discussing possible benefits and risks of DBS.  We ill do a trial of neupro patch for now. He will be set up for an Levodopa response test, was given a small script of Levodopa IR and advised that he may have to tolerate some tightness when he has the LRT (main side effect from higher doses) so that we can determine if he is levodopa responsive.     We started discussing  the possible benefits of DBS, including improvement in tremor, rigidity, bradykinesia, motor fluctuations including dyskinesias,  as well as gait if implanted bilaterally. We discussed that DBS can be helpful in treating \"off period\" freezing, but that \"on -period\" freezing may not respond, or only temporarily, as the disease progresses. We discussed that DBS typically does not treat midline or axial symptoms such as speech, swallowing or balance impairment.The goal should not be to discontinue the medications fully, rather that dosing can be reduced, and to increase the interval between dosing and the predictability of the medication response.    We discussed possible risks including stroke and infection in the jose-operative period, but he will discuss these risks in more detail with our neurosurgeons.    We discussed that DBS is not a cure, but helps the motor symptoms, and we also discussed that it is an on going therapy with frequent visits, particularly at initiation of therapy, and with a gradual taper off medication while the stimulation is being increased.     Plan:    1, continue current dose of Carbidopa/levodopa  2. Lets schedule you for a levodopa response test as well as DBS educational visit  3. Let;s try Neupro 2 mg/24 hours. Watch for rash, orthostasis,  impulse control disorder and sleep attacks.   4. Try to exercise as much as you can.   5. We will call you.   6. Speech therapy.   "

## 2024-07-11 ENCOUNTER — TELEPHONE (OUTPATIENT)
Dept: NEUROLOGY | Facility: CLINIC | Age: 69
End: 2024-07-11
Payer: MEDICARE

## 2024-07-11 DIAGNOSIS — G20.A1 PARKINSON'S DISEASE WITHOUT DYSKINESIA OR FLUCTUATING MANIFESTATIONS (MULTI): ICD-10-CM

## 2024-07-11 RX ORDER — ROTIGOTINE 2 MG/24H
1 PATCH, EXTENDED RELEASE TRANSDERMAL DAILY
Qty: 30 PATCH | Refills: 2 | Status: SHIPPED | OUTPATIENT
Start: 2024-07-11 | End: 2025-07-11

## 2024-07-11 NOTE — TELEPHONE ENCOUNTER
I called the local [facility] who say that they cannot order Neupro 2mg patches from their supplier- only 4mg available. I contacted our [employer] rep who tells me this is unusual and referred me to [facility] [phone] / [email or web address]. They will contact the local [facility] regarding their ordering problem and they will also search by zip code for preferred pharmacies in pt area.

## 2024-07-11 NOTE — TELEPHONE ENCOUNTER
I called their [facility] and let them no I got response of No KATIE murray for Neupro. Pharmacist re ran it thru his insurance and tell me 30 day cost is $428.86. I called the PT and left him a VM with this info.

## 2024-07-11 NOTE — TELEPHONE ENCOUNTER
----- Message from Nel Castillo sent at 7/11/2024  9:26 AM EDT -----  Regarding: RE: PA response  Is it not 800 dollars then?  That;s what he says it is..  ----- Message -----  From: Noe Padilla RN  Sent: 7/10/2024   3:24 PM EDT  To: Nel Castillo MD  Subject: PA response                                      Information regarding your request  Drug is covered by current benefit plan. No further PA activity needed  ----- Message -----  From: Nel Castillo MD  Sent: 7/1/2024   8:12 AM EDT  To: Noe Padilla RN    Neupro cost 800 dollars, any way we can look into an authorization for this?    Nel

## 2024-07-29 ENCOUNTER — APPOINTMENT (OUTPATIENT)
Dept: NEUROLOGY | Facility: CLINIC | Age: 69
End: 2024-07-29
Payer: MEDICARE

## 2024-07-31 ENCOUNTER — TELEPHONE (OUTPATIENT)
Dept: NEUROSURGERY | Facility: HOSPITAL | Age: 69
End: 2024-07-31

## 2024-07-31 ENCOUNTER — APPOINTMENT (OUTPATIENT)
Dept: NEUROLOGY | Facility: CLINIC | Age: 69
End: 2024-07-31
Payer: MEDICARE

## 2024-07-31 VITALS
BODY MASS INDEX: 20.37 KG/M2 | DIASTOLIC BLOOD PRESSURE: 86 MMHG | HEART RATE: 86 BPM | RESPIRATION RATE: 16 BRPM | WEIGHT: 115 LBS | SYSTOLIC BLOOD PRESSURE: 135 MMHG

## 2024-07-31 DIAGNOSIS — G20.A1 PARKINSON'S DISEASE WITHOUT DYSKINESIA OR FLUCTUATING MANIFESTATIONS (MULTI): Primary | ICD-10-CM

## 2024-07-31 PROCEDURE — G2212 PROLONG OUTPT/OFFICE VIS: HCPCS | Performed by: NURSE PRACTITIONER

## 2024-07-31 PROCEDURE — 99215 OFFICE O/P EST HI 40 MIN: CPT | Performed by: NURSE PRACTITIONER

## 2024-07-31 ASSESSMENT — UNIFIED PARKINSONS DISEASE RATING SCALE (UPDRS)
RIGIDITY_LUE: 2
PRONATION_SUPINATION_RIGHT: 1
RIGIDITY_LLE: 3
HANDMOVEMENTS_RIGHT: 0
TOETAPPING_RIGHT: 2
POSTURAL_STABILITY: 0
LEVODOPA: YES
KINETIC_TREMOR_RIGHTHAND: 1
FINGER_TAPPING_RIGHT: 3
PRONATION_SUPINATION_LEFT: 1
AMPLITUDE_LUE: 1
RIGIDITY_NECK: 2
CHAIR_RISING_SCALE: 1
GAIT: 2
FACIAL_EXPRESSION: 2
HANDMOVEMENTS_RIGHT: 1
FINGER_TAPPING_LEFT: 3
PRONATION_SUPINATION_LEFT: 3
CLINICAL_STATE: OFF
KINETIC_TREMOR_LEFTHAND: 1
POSTURE: 2
SPONTANEITY_OF_MOVEMENT: 2
PARKINSONS_MEDS: YES
POSTURAL_TREMOR_RIGHTHAND: 2
AMPLITUDE_RLE: 2
LEG_AGILITY_LEFT: 1
FINGER_TAPPING_LEFT: 3
AMPLITUDE_LIP_JAW: 0
PRONATION_SUPINATION_RIGHT: 1
FACIAL_EXPRESSION: 2
AMPLITUDE_LLE: 0
AMPLITUDE_RUE: 1
AMPLITUDE_RUE: 2
LEG_AGILITY_RIGHT: 1
FINGER_TAPPING_RIGHT: 2
POSTURAL_TREMOR_LEFTHAND: 1
CLINICAL_STATE: ON
DYSKINESIAS_PRESENT: NO
CONSTANCY_TREMOR_ATREST: 4
POSTURAL_TREMOR_LEFTHAND: 1
AMPLITUDE_LUE: 0
RIGIDITY_RUE: 3
SPEECH: 2
LEG_AGILITY_LEFT: 1
KINETIC_TREMOR_RIGHTHAND: 1
TOETAPPING_LEFT: 2
CHAIR_RISING_SCALE: 2
AMPLITUDE_LIP_JAW: 0
POSTURE: 3
POSTURAL_TREMOR_RIGHTHAND: 1
RIGIDITY_RLE: 3
RIGIDITY_RLE: 2
POSTURAL_STABILITY: 0
RIGIDITY_LLE: 2
TOTAL_SCORE: 51
LEG_AGILITY_RIGHT: 1
TOETAPPING_RIGHT: 2
CONSTANCY_TREMOR_ATREST: 4
GAIT: 2
AMPLITUDE_RLE: 1
FREEZING_GAIT: 0
RIGIDITY_LUE: 2
KINETIC_TREMOR_LEFTHAND: 0
DYSKINESIAS_PRESENT: NO
TOTAL_SCORE: 56
SPONTANEITY_OF_MOVEMENT: 3
AMPLITUDE_LLE: 1
TOETAPPING_LEFT: 2
FREEZING_GAIT: 0
RIGIDITY_RUE: 3
SPEECH: 2
LEVODOPA: YES
RIGIDITY_NECK: 3
PARKINSONS_MEDS: YES

## 2024-07-31 ASSESSMENT — PATIENT HEALTH QUESTIONNAIRE - PHQ9
2. FEELING DOWN, DEPRESSED OR HOPELESS: NOT AT ALL
SUM OF ALL RESPONSES TO PHQ9 QUESTIONS 1 AND 2: 0
1. LITTLE INTEREST OR PLEASURE IN DOING THINGS: NOT AT ALL

## 2024-07-31 ASSESSMENT — ENCOUNTER SYMPTOMS
OCCASIONAL FEELINGS OF UNSTEADINESS: 1
DEPRESSION: 0
LOSS OF SENSATION IN FEET: 0

## 2024-07-31 NOTE — PROGRESS NOTES
"Subjective     Raghu Gonzalez is a 68 y.o. year old male who presents with Parkinson's Disease, here for follow up visit.    HPI    Here for LRT and DBS educational visit with wife.     Neupro patch x 2 weeks--is expensive. Not sure it is helping. Always tired, naps 1.5hrs in am and afternoon and no worse on Neupro. Wakes at 430-5am    When he wakes at 8am he is calmer, more relaxed, walks OK, in the best shape of his life.  Home 99% of the time so not doing much and anxiety is minimal if at home and going out into the car is difficult.      MOTOR SYMPTOMS      +/-                            Comments  Motor sx overall  Little sx for several yrs then progressing the past few yrs quicker   Tremor + Nuisance, responds some to meds    Rigidity + Does not improves, mostly upper body   Bradykinesia +    Gait difficulty + Is careful   FOG + Worsening the past few months ((does not feel r/t Neupro which he has been on x 2 weeks))   Falls -    PT -    Exercise + Stationary bike 5x/week x 20 mins, stretching, free weights            [facility] Meds  Med Dose Time   Carbidopa/levodopa  mg ER  Higher doses=SE muscle tightness  1 tab 4 times a day 8 am -12 pm -4 pm -8 pm              Latency unaware    Wearing off none    Side effects     Dyskinesia None    Hallucinations None    Impulse control None    Sudden sleep None    Other No [place] sx  Ocassional constipation      Prior medications:  Rytary was too costly/never tried  Amantadine caused loss of appetite and associated weight loss  IR C/L caused neck tightness like dyskinesias  Ropinirole and Pramipexole - developed dyskinesia on higher doses and he had balance issues and frequent falls.  Modafinil 200 mg- ineffective                                                                                         DBS Candidacy  Why patient wants DBS \"It is my best chance at living a fairly normal life to help with bradykinesia and tremors\"   Most bothersome symptom " bradykinesia   Onset symptoms 2013   PD diagnosis date  2013   Sx worse on R or L side R side   Onset of motor fluctuations/dyskinesias Never   Medication history See above   Gait impairment Mild issues   FOG +   Hallucinations Never   Impulsivity/ICD Never   Mood history: On a few antidepressants and mood stable, sees psychiatry   Cognitive decline: Denies issues   Caregiver support Wife   Additional medical issues Denies seeing other medical specialists besides psychiatry   Neuropsychologic test Not ordered   Neurosurgical consult Not ordered          Patient Health Questionnaire-2 Score: 0            Current Outpatient Medications:     atenolol (Tenormin) 50 mg tablet, Take 1 tablet (50 mg) by mouth once daily., Disp: 90 tablet, Rfl: 3    bisacodyl (Dulcolax, bisacodyl,) 5 mg EC tablet, Take 1 tablet (5 mg) by mouth once daily., Disp: , Rfl:     buPROPion XL (Wellbutrin XL) 300 mg 24 hr tablet, Take 1 tablet (300 mg) by mouth once daily., Disp: 90 tablet, Rfl: 3    carbidopa-levodopa (Sinemet CR)  mg ER tablet, Take 1 tablet by mouth 4 times a day., Disp: , Rfl:     carbidopa-levodopa (Sinemet)  mg tablet, For levodopa response test., Disp: 5 tablet, Rfl: 0    fluticasone (Flonase) 50 mcg/actuation nasal spray, Administer 1 spray into each nostril once daily. Shake gently. Before first use, prime pump. After use, clean tip and replace cap., Disp: 16 g, Rfl: 11    ibuprofen 600 mg tablet, Take by mouth 3 times a day as needed., Disp: , Rfl:     rotigotine (Neupro) 2 mg/24 hour, Place 1 patch over 24 hours on the skin once daily., Disp: 30 patch, Rfl: 2    sertraline (Zoloft) 100 mg tablet, Take 1 tablet (100 mg) by mouth once daily., Disp: 90 tablet, Rfl: 3    simvastatin (Zocor) 10 mg tablet, Take 1 tablet (10 mg) by mouth once daily at bedtime., Disp: 100 tablet, Rfl: 3    traZODone (Desyrel) 50 mg tablet, Take 1 tablet (50 mg) by mouth as needed at bedtime for sleep., Disp: 90 tablet, Rfl: 3  "      Objective   Vitals:    24 1354 24 1355   BP: 132/82 135/86   BP Location: Left arm Left arm   Patient Position: Sitting Standing   BP Cuff Size: Adult Adult   Pulse: 84 86   Resp: 16    Weight: 52.2 kg (115 lb)                  Physical Exam    OFF meds:  Absent arm swing, narrow base, 3 step turn  RUE tremor not seen on video but up to     ON meds--not much difference in exam, his entire body is shaky, overall he feels somewhat tighter and \"tremory\"  RUE tremor gets up to 2    MDS UPDRS 1st Score: Motor Examination  Is the patient on medication for treating the symptoms of Parkinson's Disease?: Yes  Patients receiving medication for treating the symptoms of Parkinson's Disease, jessica the patient's clinical state.: Off  Is the patient on Levodopa?: Yes  Minutes since last Levodopa dose: 18.5hrs  Speech: 2  Facial Expression: 2  Rigidty Neck: 3  Rigidty RUE: 3  Rigidity - LUE: 2  Rigidity RLE: 3  Rigidity LLE: 3  Finger Tapping Right Hand: 2  Finger Tapping Left Hand: 3  Hand Movements- Right Hand: 0  Hand Movements- Left Hand: 1  Pronatiaon-Supination Movments - Right Hand: 1  Pronatiaon-Supination Movments Left Hand: 1  Toe Tapping Right Foot: 2  Toe Tapping - Left Foot: 2  Leg Agility - Right Le  Leg Agility - Left le  Arising from Chair: 1  Gait: 2  Freezing of Gait: 0  Postural Stability: 0  Posture: 3  Global Spontanteity of Movment ( Body Bradykinesia): 3  Postural Tremor - Right Hand: 1  Postural Tremor - Left hand: 1  Kinetic Tremor - Right hand: 1  Kinetic Tremor - Left hand: 0  Rest Tremor Amplitude - RUE: 1  Rest Tremor Amplitude - LUE: 0  Rest Tremor Amplitude - RLE: 2  Rest Tremor Amplitude - LLE: 0  Rest Tremor Amplitude - Lip/Jaw: 0  Constancy of Rest Tremor: 4  MDS UPDRS Total Score: 51  Were dyskinesias (chorea or dystonia) present during examination?: No    MDS UPDRS 2nd Score: Motor Examination  Is the patient on medication for treating the symptoms of Parkinson's " Disease?: Yes  Patients receiving medication for treating the symptoms of Parkinson's Disease, jessica the patient's clinical state.: On  Is the patient on Levodopa?: Yes  Minutes since last Levodopa dose: 75min  Speech: 2  Facial Expression: 2  Rigidty Neck: 2  Rigidty RUE: 3  Rigidity - LUE: 2  Rigidity RLE: 2  Rigidity LLE: 2  Finger Tapping Right Hand: 3  Finger Tapping Left Hand: 3  Hand Movements- Right Hand: 1  Hand Movements- Left Hand: 2  Pronatiaon-Supination Movments - Right Hand: 1  Pronatiaon-Supination Movments Left Hand: 3  Toe Tapping Right Foot: 2  Toe Tapping - Left Foot: 2  Leg Agility - Right Le  Leg Agility - Left le  Arising from Chair: 2  Gait: 2  Freezing of Gait: 0  Postural Stability: 0  Posture: 2  Global Spontanteity of Movment ( Body Bradykinesia): 2  Postural Tremor - Right Hand: 2  Postural Tremor - Left hand: 1  Kinetic Tremor - Right hand: 1  Kinetic Tremor - Left hand: 1  Rest Tremor Amplitude - RUE: 2  Rest Tremor Amplitude - LUE: 1  Rest Tremor Amplitude - RLE: 1  Rest Tremor Amplitude - LLE: 1  Rest Tremor Amplitude - Lip/Jaw: 0  Constancy of Rest Tremor: 4  MDS UPDRS Total Score: 56  Were dyskinesias (chorea or dystonia) present during examination?: No          Assessment/Plan   Mr. Raghu Gonzalez is a 68 y.o. M with PD since  seen for LRT and DBS educational visit. He has been unable to tolerate Sinemet d/t SE muscle tightening and is pretty rigid in general, had worsening of motor sx overall ON 2 tabs Sinemet.   He presented for Deep Brain Stimulation educational visit. Today we reviewed a PowerPoint on DBS to discuss risks, benefits, treatment plan and followup required that may take 6 to 12 months to achieve stable benefit, also showed images of the lead and battery, discussed the 3 types of systems available and allowed the patient to ask any questions-summary in patient AVS. All questions were answered.    Of note, wife and patient feel that when he wakes at 8am  prior to meds he is calmer, more relaxed, walks OK, in the best shape of his life.  He is unsure if Neupro is worsening FOG and would like to try stopping, is also expensive.     LRT-2 tabs Sinemet IR today--video taped  OFF: 51  ON: 56    PLAN  Will d/w Dr. Castillo if OK to try Rytary 4 caps TID to see if he can have better benefit and less SE and if tolerated, may try 4 or 6 tabs for repeat LRT.       Diagnoses and all orders for this visit:  Parkinson's disease without dyskinesia or fluctuating manifestations (Multi)  -     Referral to Neurosurgery; Future  -     Referral to Neuropsychology; Future        #Rytary samples given, please do not take until instructed to do so    #Stop Neupro 2mg patch x 1 week, pay attention to freezing of gait, send me a message to let me know how it goes and we can decide if we will restart    #Consult neuropsychology and neurosurgery-- their office will call to schedule    #Deep Brain Stimulation (DBS) helps treat the motor symptoms of Parkinson's disease (including tremor, slowness, stiffness, motor fluctuations).We discussed freezing of gait may not respond, or only temporarily, as the disease progresses can worsen. It can also help with night time mobility as the therapy is on 24 hours a day, and therefore can help with sleep. It does not treat non-motor symptoms (such as anxiety, depression, urinary issues, sleep problems, cognitive decline, balance problems, speech, swallowing, etc). In fact, it can worsen balance/falls, cognition/memory/thinking, and speech - if these are major issues, then DBS may not be the best treatment. After DBS surgery, people are usually able to reduce (though not eliminate) their Parkinson's medications. It may take up to 6-12 months to optimize DBS settings after surgery.    We discussed possible risks including stroke and infection in the jose-operative period, but please plan to discuss these risks in more detail with our neurosurgeon. Reversible  stimulation induced side effects include tingling and pulling from spread of stimulation to surrounding areas. We discussed possible cognitive side effects including word finding difficulties as well as executive dysfunction (attention,recall, multitasking).    We discussed that DBS is not a cure, but helps the motor symptoms, and we also discussed that it is an on going therapy with frequent visits.    -The DBS will help with: slowness, stiffness, tremor, shuffling, motor fluctuations (due to medications wearing off), dyskinesias (wiggly movements)  -The DBS will NOT help with: cognition, urinary issues, mood, hallucinations, and certain midline symptoms such as postural instability, balance problems, freezing of gait, speech       There are the steps needed prior to getting DBS (in no particular order):  Neuropsychological testing (several hours of cognitive testing to evaluate for mild cognitive impairment or dementia)  Levodopa response testing in clinic (neurologic exam 12hrs OFF of Parkinson's meds and 1hr after taking Parkinson's medications)  Consultation with the neurosurgeon     After above is completed, your case will be discussed at our bi-monthly DBS meeting and we will let you know if you are a candidate and set up the steps if you would like to proceed.         More information about DBS can be found online at:   - www.parkinson.org/sites/default/files/Guide_to_DBS_Stimulation_Therapy.pdf   - www.ninds.nih.gov/Disorders/All-Disorders/Deep-Brain-Stimulation-Parkinsons-Disease-Information-Page     GWEN ADKINS, personally performed  a medically appropriate exam, discussion of care/treatment options taking a total time of 98minutes for today's visit.      Theron Chen NP-C  Adult/Gerontological Nurse Practitioner   [facility], Department of Neurology  [facility]  [facility]  [address] [place] [address]  Phone:  [phone]  Fax: [phone]

## 2024-07-31 NOTE — PATIENT INSTRUCTIONS
#Rytary samples given, please do not take until instructed to do so    #Stop Neupro 2mg patch x 1 week, pay attention to freezing of gait, send me a message to let me know how it goes and we can decide if we will restart    #Consult neuropsychology and neurosurgery-- their office will call to schedule    #Deep Brain Stimulation (DBS) helps treat the motor symptoms of Parkinson's disease (including tremor, slowness, stiffness, motor fluctuations).We discussed freezing of gait may not respond, or only temporarily, as the disease progresses can worsen. It can also help with night time mobility as the therapy is on 24 hours a day, and therefore can help with sleep. It does not treat non-motor symptoms (such as anxiety, depression, urinary issues, sleep problems, cognitive decline, balance problems, speech, swallowing, etc). In fact, it can worsen balance/falls, cognition/memory/thinking, and speech - if these are major issues, then DBS may not be the best treatment. After DBS surgery, people are usually able to reduce (though not eliminate) their Parkinson's medications. It may take up to 6-12 months to optimize DBS settings after surgery.    We discussed possible risks including stroke and infection in the jose-operative period, but please plan to discuss these risks in more detail with our neurosurgeon. Reversible stimulation induced side effects include tingling and pulling from spread of stimulation to surrounding areas. We discussed possible cognitive side effects including word finding difficulties as well as executive dysfunction (attention,recall, multitasking).    We discussed that DBS is not a cure, but helps the motor symptoms, and we also discussed that it is an on going therapy with frequent visits.    -The DBS will help with: slowness, stiffness, tremor, shuffling, motor fluctuations (due to medications wearing off), dyskinesias (wiggly movements)  -The DBS will NOT help with: cognition, urinary issues,  mood, hallucinations, and certain midline symptoms such as postural instability, balance problems, freezing of gait, speech       There are the steps needed prior to getting DBS (in no particular order):  Neuropsychological testing (several hours of cognitive testing to evaluate for mild cognitive impairment or dementia)  Levodopa response testing in clinic (neurologic exam 12hrs OFF of Parkinson's meds and 1hr after taking Parkinson's medications)  Consultation with the neurosurgeon     After above is completed, your case will be discussed at our bi-monthly DBS meeting and we will let you know if you are a candidate and set up the steps if you would like to proceed.         More information about DBS can be found online at:   - www.parkinson.org/sites/default/files/Guide_to_DBS_Stimulation_Therapy.pdf   - www.ninds.nih.gov/Disorders/All-Disorders/Deep-Brain-Stimulation-Parkinsons-Disease-Information-Page         ALEJANDRO Landa  Adult/Gerontological Nurse Practitioner   [facility], Department of Neurology  Neurological [facility]  [address]  [place] [id number]  Phone: [phone]  Fax: [phone]

## 2024-08-14 DIAGNOSIS — G20.A1 PARKINSON'S DISEASE WITHOUT DYSKINESIA OR FLUCTUATING MANIFESTATIONS (MULTI): Primary | ICD-10-CM

## 2024-08-14 RX ORDER — LEVODOPA AND CARBIDOPA 95; 23.75 MG/1; MG/1
2 CAPSULE, EXTENDED RELEASE ORAL 3 TIMES DAILY
Qty: 180 CAPSULE | Refills: 11 | Status: SHIPPED | OUTPATIENT
Start: 2024-08-14

## 2024-08-29 PROBLEM — M75.122 COMPLETE TEAR OF LEFT ROTATOR CUFF: Status: ACTIVE | Noted: 2024-08-29

## 2024-08-29 PROBLEM — J32.0 RIGHT MAXILLARY SINUSITIS: Status: ACTIVE | Noted: 2024-08-29

## 2024-08-29 PROBLEM — R26.89 IMPAIRMENT OF BALANCE: Status: ACTIVE | Noted: 2024-08-29

## 2024-08-29 PROBLEM — M54.41 CHRONIC RIGHT-SIDED LOW BACK PAIN WITH RIGHT-SIDED SCIATICA: Status: ACTIVE | Noted: 2024-08-29

## 2024-08-29 PROBLEM — M54.16 LUMBAR RADICULOPATHY: Status: ACTIVE | Noted: 2024-08-29

## 2024-08-29 PROBLEM — G47.19 EXCESSIVE DAYTIME SLEEPINESS: Status: ACTIVE | Noted: 2024-08-29

## 2024-08-29 PROBLEM — R63.4 WEIGHT LOSS, UNINTENTIONAL: Status: ACTIVE | Noted: 2024-08-29

## 2024-08-29 PROBLEM — M43.17 SPONDYLOLISTHESIS AT L5-S1 LEVEL: Status: ACTIVE | Noted: 2024-08-29

## 2024-08-29 PROBLEM — F19.959: Status: ACTIVE | Noted: 2024-08-29

## 2024-08-29 PROBLEM — G47.00 INSOMNIA: Status: ACTIVE | Noted: 2024-08-29

## 2024-08-29 PROBLEM — M48.062 SPINAL STENOSIS OF LUMBAR REGION WITH NEUROGENIC CLAUDICATION: Status: ACTIVE | Noted: 2024-08-29

## 2024-08-29 PROBLEM — G89.29 CHRONIC RIGHT-SIDED LOW BACK PAIN WITH RIGHT-SIDED SCIATICA: Status: ACTIVE | Noted: 2024-08-29

## 2024-08-29 PROBLEM — R25.1 TREMOR: Status: ACTIVE | Noted: 2024-08-29

## 2024-08-29 PROBLEM — F32.89 OTHER SPECIFIED DEPRESSIVE EPISODES: Status: ACTIVE | Noted: 2024-08-29

## 2024-09-05 ENCOUNTER — APPOINTMENT (OUTPATIENT)
Dept: NEUROSURGERY | Facility: CLINIC | Age: 69
End: 2024-09-05
Payer: MEDICARE

## 2024-09-06 ENCOUNTER — APPOINTMENT (OUTPATIENT)
Dept: NEUROSURGERY | Facility: CLINIC | Age: 69
End: 2024-09-06
Payer: MEDICARE

## 2024-09-12 ENCOUNTER — PATIENT MESSAGE (OUTPATIENT)
Dept: BEHAVIORAL HEALTH | Facility: CLINIC | Age: 69
End: 2024-09-12
Payer: MEDICARE

## 2024-09-12 DIAGNOSIS — F41.8 DEPRESSION WITH ANXIETY: ICD-10-CM

## 2024-09-12 RX ORDER — TRAZODONE HYDROCHLORIDE 50 MG/1
50 TABLET ORAL NIGHTLY PRN
Qty: 90 TABLET | Refills: 3 | Status: SHIPPED | OUTPATIENT
Start: 2024-09-12 | End: 2025-09-12

## 2024-09-12 RX ORDER — BUPROPION HYDROCHLORIDE 300 MG/1
300 TABLET ORAL DAILY
Qty: 90 TABLET | Refills: 3 | Status: SHIPPED | OUTPATIENT
Start: 2024-09-12 | End: 2025-09-12

## 2024-09-12 RX ORDER — SERTRALINE HYDROCHLORIDE 100 MG/1
100 TABLET, FILM COATED ORAL
Qty: 90 TABLET | Refills: 3 | Status: SHIPPED | OUTPATIENT
Start: 2024-09-12 | End: 2025-09-12

## 2024-09-26 ENCOUNTER — PATIENT MESSAGE (OUTPATIENT)
Dept: NEUROLOGY | Facility: HOSPITAL | Age: 69
End: 2024-09-26
Payer: MEDICARE

## 2024-09-26 DIAGNOSIS — G20.A1 PARKINSON'S DISEASE WITHOUT DYSKINESIA OR FLUCTUATING MANIFESTATIONS: ICD-10-CM

## 2024-09-30 RX ORDER — LEVODOPA AND CARBIDOPA 95; 23.75 MG/1; MG/1
1 CAPSULE, EXTENDED RELEASE ORAL 3 TIMES DAILY
Start: 2024-09-30

## 2024-10-22 ENCOUNTER — APPOINTMENT (OUTPATIENT)
Dept: BEHAVIORAL HEALTH | Facility: CLINIC | Age: 69
End: 2024-10-22
Payer: MEDICARE

## 2024-10-22 VITALS
RESPIRATION RATE: 16 BRPM | TEMPERATURE: 98.7 F | HEIGHT: 63 IN | DIASTOLIC BLOOD PRESSURE: 92 MMHG | WEIGHT: 113.6 LBS | BODY MASS INDEX: 20.13 KG/M2 | HEART RATE: 90 BPM | SYSTOLIC BLOOD PRESSURE: 148 MMHG

## 2024-10-22 DIAGNOSIS — F41.8 DEPRESSION WITH ANXIETY: ICD-10-CM

## 2024-10-22 PROCEDURE — 1160F RVW MEDS BY RX/DR IN RCRD: CPT | Performed by: PSYCHIATRY & NEUROLOGY

## 2024-10-22 PROCEDURE — 3080F DIAST BP >= 90 MM HG: CPT | Performed by: PSYCHIATRY & NEUROLOGY

## 2024-10-22 PROCEDURE — 3008F BODY MASS INDEX DOCD: CPT | Performed by: PSYCHIATRY & NEUROLOGY

## 2024-10-22 PROCEDURE — 99214 OFFICE O/P EST MOD 30 MIN: CPT | Performed by: PSYCHIATRY & NEUROLOGY

## 2024-10-22 PROCEDURE — 1159F MED LIST DOCD IN RCRD: CPT | Performed by: PSYCHIATRY & NEUROLOGY

## 2024-10-22 PROCEDURE — 1126F AMNT PAIN NOTED NONE PRSNT: CPT | Performed by: PSYCHIATRY & NEUROLOGY

## 2024-10-22 PROCEDURE — 3077F SYST BP >= 140 MM HG: CPT | Performed by: PSYCHIATRY & NEUROLOGY

## 2024-10-22 RX ORDER — BUPROPION HYDROCHLORIDE 150 MG/1
150 TABLET ORAL DAILY
Qty: 90 TABLET | Refills: 0 | Status: SHIPPED | OUTPATIENT
Start: 2024-10-22 | End: 2025-01-20

## 2024-10-22 RX ORDER — SERTRALINE HYDROCHLORIDE 50 MG/1
TABLET, FILM COATED ORAL
Qty: 90 TABLET | Refills: 1 | Status: SHIPPED | OUTPATIENT
Start: 2024-10-22

## 2024-10-22 ASSESSMENT — COLUMBIA-SUICIDE SEVERITY RATING SCALE - C-SSRS
2. HAVE YOU ACTUALLY HAD ANY THOUGHTS OF KILLING YOURSELF?: NO
TOTAL  NUMBER OF INTERRUPTED ATTEMPTS LIFETIME: NO
TOTAL  NUMBER OF INTERRUPTED ATTEMPTS SINCE LAST CONTACT: NO
TOTAL  NUMBER OF ABORTED OR SELF INTERRUPTED ATTEMPTS LIFETIME: NO
6. HAVE YOU EVER DONE ANYTHING, STARTED TO DO ANYTHING, OR PREPARED TO DO ANYTHING TO END YOUR LIFE?: NO
1. SINCE LAST CONTACT, HAVE YOU WISHED YOU WERE DEAD OR WISHED YOU COULD GO TO SLEEP AND NOT WAKE UP?: NO
ATTEMPT LIFETIME: NO
6. HAVE YOU EVER DONE ANYTHING, STARTED TO DO ANYTHING, OR PREPARED TO DO ANYTHING TO END YOUR LIFE?: NO
TOTAL  NUMBER OF ABORTED OR SELF INTERRUPTED ATTEMPTS SINCE LAST CONTACT: NO
1. HAVE YOU WISHED YOU WERE DEAD OR WISHED YOU COULD GO TO SLEEP AND NOT WAKE UP?: NO
ATTEMPT SINCE LAST CONTACT: NO
2. HAVE YOU ACTUALLY HAD ANY THOUGHTS OF KILLING YOURSELF?: NO
SUICIDE, SINCE LAST CONTACT: NO

## 2024-10-22 ASSESSMENT — PAIN SCALES - GENERAL: PAINLEVEL_OUTOF10: 0-NO PAIN

## 2024-10-22 NOTE — PATIENT INSTRUCTIONS
Plan;   - Decrease wellbutrin XL to 150mg daily in the morning.   - Increase sertraline to 125mg (100mg + half of 50mg tabs) for 2 weeks, then 150mg (100mg + 50mg tabs) daily.   - Please contact me in case of any side effects or concerns.    - Continue trazodone 50mg as needed for sleep.   - Recommend regular social activities; [facility]   - Follow up regularly with neurology to manage your Parkinson's disease symptoms.   - Follow up in about 2 months; virtual is okay if you prefer.   - Contact via Meetmeals or call sooner ([phone]) in case of any questions or concerns.  - Call [phone] for mental health crisis or suicidal thoughts, or call 911 or go to the nearest emergency room for emergencies.    Brain-healthy lifestyle:   - Make sure your medical conditions (if any) such as diabetes, high blood pressure, high cholesterol, thyroid disease sleep apnea are optimally controlled.   - Use eyeglasses or hearing aids appropriately if needed.   - Eat a heart healthy diet (like a Mediterranean diet; lots of fruits and vegetables, fish; low fat;)  - Exercise regularly as tolerated.   - Maintain good sleep hygiene; avoid daytime naps; try to get 7 to 8 hours of continuous sleep at night.   - Stay mentally active - puzzles, word searches, books, playing cards.  - Stay socially active and engaged.

## 2024-10-22 NOTE — PROGRESS NOTES
"Outpatient Psychiatry Follow up visit    Mr. Raghu Gonzalez is a 69-year-old man with a history of anxiety, depression and Parkinson's disease. Seen in office for follow up today.     Subjective:      He says he is \"not too good.\"   He says that the Parkinson's disease is progressing. He says the tremors are getting worse.   He says that he gets very anxious; he says he feels comfortable at home but gets anxious when he thinks about going out. He says when he is around people, he does feel better.  He says he used hydroxyzine in the past and it seemed to help.     He says he is on Rytary; he says when he takes it, he notices more problems walking.       He says his mood is \"happy\" when he is home.   Sleep - falls asleep after taking trazodone but has trouble staying asleep because he needs to go to the bathroom. He gets about 5 to 6 hours during the night, and may take 1 or 2 hours nap during the day.   Appetite - \"not very good.\" He says he has lost weight. He says he uses marijuana edibles to stimulate appetite. He has lost about 6 lbs since last year.   Energy - low. He rides a stationary bike almost daily.   Concentration - pretty good.   No hopelessness or worthlessness.   Denies any death wishes, suicidal thoughts, intent or plans.      No panic attacks.      No manic or hypomanic episodes.      Denies having any visual illusions or hallucinations recently. No paranoia or delusional beliefs.      No compulsive or impulsive behaviors.      Feels memory is stable. Denies problems.  Does not drive. Wife is driving.   Manages finances.     He says he feels \"like a zombie\" when he takes sertraline and wellbutrin together in the morning; so he takes it at bedtime.      Current medications:   Sertraline 100mg at bedtime  Bupropion XL 300mg at bedtime  Trazodone 25 to 50mg at bedtime.     Past medications:   Modafinil 100mg for daytime sleepiness - was ineffective and made him nervous.        Psychiatric Review Of " "Systems:   As above.       Medical Review Of Systems:    Pertinent items are noted in HPI.        Record Review: brief       Mental Status Evaluation:  MSE:  Appearance: Fair grooming.   Behavior/Attitude: Cooperative. Pleasant.   Motor: Slowed psychomotor activity. Tremors in hands.   Gait: slow. Diminished arm swings.   Speech: Regular in rate, tone and volume. No pressure.  Mood: \"not so good.\"  Affect: Congruent to stated mood. Mobilized appropriately. Normal range.   Thought process: Goal-directed. Linear. Organized.  Thought content: No paranoia, delusion or ideas of reference elicited. No hallucinations in auditory, visual or other sensory modalities.   Suicidal ideation: denied.  Homicidal ideation: denied.   Insight: Fair.  Judgment: Fair.  Orientation: oriented correctly to time, place and person.  Recent and remote memory: intact based on recall of recent and remote autobiographical memories.  Attention/concentration: intact during visit  Language: No aphasia or paraphasic errors during conversation   Fund of knowledge: Average    Assessment/Plan   Assessment:   Mr. Raghu Gonzalez is a 69-year-old man with a history of anxiety, depression and Parkinson's disease. Seen in office for follow up today. '    10/22/24: Reports increase in anxiety related to worsening PD symptoms.      Diagnosis:   Other specified anxiety disorder  Other specified depressive disorder    Treatment Plan/Recommendations:   - Discussed treatment options; decided to lower wellbutrin dose and gradually titrate sertraline to 150mg/day for anxiety. Discussed risks, including mood changes, and side effects.   - Continue trazodone as needed for sleep.   - Discussed risks vs benefits of using hydroxyzine as needed for anxiety; decided to defer.   - Provided supportive therapy. Encouraged to engage in social activities and consider returning to [employer].   - Continue PD management with neurology.   - Follow up in 2 months.     Review with " patient: Treatment plan reviewed with the patient.    Kaitlin Nixon MD.

## 2024-11-13 ENCOUNTER — APPOINTMENT (OUTPATIENT)
Dept: NEUROLOGY | Facility: CLINIC | Age: 69
End: 2024-11-13
Payer: MEDICARE

## 2024-11-13 VITALS
DIASTOLIC BLOOD PRESSURE: 83 MMHG | BODY MASS INDEX: 20.19 KG/M2 | RESPIRATION RATE: 12 BRPM | HEART RATE: 89 BPM | WEIGHT: 114 LBS | SYSTOLIC BLOOD PRESSURE: 129 MMHG

## 2024-11-13 DIAGNOSIS — G20.A1 PARKINSON'S DISEASE WITHOUT DYSKINESIA OR FLUCTUATING MANIFESTATIONS: Primary | ICD-10-CM

## 2024-11-13 PROCEDURE — 1160F RVW MEDS BY RX/DR IN RCRD: CPT | Performed by: NURSE PRACTITIONER

## 2024-11-13 PROCEDURE — 3079F DIAST BP 80-89 MM HG: CPT | Performed by: NURSE PRACTITIONER

## 2024-11-13 PROCEDURE — 1159F MED LIST DOCD IN RCRD: CPT | Performed by: NURSE PRACTITIONER

## 2024-11-13 PROCEDURE — 3074F SYST BP LT 130 MM HG: CPT | Performed by: NURSE PRACTITIONER

## 2024-11-13 PROCEDURE — 1036F TOBACCO NON-USER: CPT | Performed by: NURSE PRACTITIONER

## 2024-11-13 PROCEDURE — 99214 OFFICE O/P EST MOD 30 MIN: CPT | Performed by: NURSE PRACTITIONER

## 2024-11-13 ASSESSMENT — UNIFIED PARKINSONS DISEASE RATING SCALE (UPDRS)
PRONATION_SUPINATION_RIGHT: 1
PRONATION_SUPINATION_LEFT: 2
RIGIDITY_RLE: 2
CLINICAL_STATE: ON
RIGIDITY_LUE: 2
SPEECH: 2
CONSTANCY_TREMOR_ATREST: 4
POSTURE: 2
TOETAPPING_LEFT: 3
TOTAL_SCORE: 54
RIGIDITY_RUE: 3
POSTURAL_TREMOR_LEFTHAND: 0
AMPLITUDE_RLE: 1
POSTURAL_TREMOR_RIGHTHAND: 1
CHAIR_RISING_SCALE: 2
FACIAL_EXPRESSION: 3
RIGIDITY_NECK: 2
FINGER_TAPPING_LEFT: 3
FREEZING_GAIT: 0
AMPLITUDE_LIP_JAW: 0
SPONTANEITY_OF_MOVEMENT: 3
AMPLITUDE_RUE: 2
POSTURAL_STABILITY: 2
LEG_AGILITY_RIGHT: 2
GAIT: 2
KINETIC_TREMOR_LEFTHAND: 0
PARKINSONS_MEDS: YES
RIGIDITY_LLE: 2
TOETAPPING_RIGHT: 1
DYSKINESIAS_PRESENT: NO
AMPLITUDE_LUE: 0
LEVODOPA: YES
KINETIC_TREMOR_RIGHTHAND: 1
AMPLITUDE_LLE: 0
LEG_AGILITY_LEFT: 3
HANDMOVEMENTS_RIGHT: 0
FINGER_TAPPING_RIGHT: 2

## 2024-11-13 ASSESSMENT — ENCOUNTER SYMPTOMS
OCCASIONAL FEELINGS OF UNSTEADINESS: 1
DEPRESSION: 0
LOSS OF SENSATION IN FEET: 0

## 2024-11-13 ASSESSMENT — PATIENT HEALTH QUESTIONNAIRE - PHQ9
SUM OF ALL RESPONSES TO PHQ9 QUESTIONS 1 AND 2: 0
2. FEELING DOWN, DEPRESSED OR HOPELESS: NOT AT ALL
1. LITTLE INTEREST OR PLEASURE IN DOING THINGS: NOT AT ALL

## 2024-11-13 NOTE — PROGRESS NOTES
"Subjective     Raghu Gonzalez is a 69 y.o. year old male who presents with Parkinson's Disease, here for follow up visit.    HPI  Here with wife.    Last visit 7/31/24 was LRT with me, he declines to move forward with DBS.     Sx are worse the past 1M.  FOG and tremors and nothing helping.   Very rigid/stiff.     Rytary 1 cap not helpful and 2 caps makes him feel more stiff.   Went 4 days without Rytary and no difference.     Eating well, 3 meals a day, maybe smaller than normal portions.  Denies dysphagia.       MOTOR SYMPTOMS      +/-                            Comments  Motor sx overall  Little sx for several yrs then progressing the past few yrs quicker   Tremor + Nuisance, tremor not better w/ Rytary   Rigidity + Does not improves, mostly upper body   Bradykinesia + \"Pretty bad\"   Gait difficulty + Very careful   FOG + Worsening    Falls -    PT -    Exercise + Stationary bike 5x/week x 20 mins, stretching, free weights--continues this [facility]            [facility] Meds  Med Dose Time   Rytary 95mg  1 tab 2 times a day  (Ordered TID) 8 am -12 pm -4 pm -8 pm              Latency unaware    Wearing off none    Side effects     Dyskinesia None    Hallucinations None    Other No OH sx  Constipation slightly worse (q1-2d BM)      Prior medications:  Rytary was too costly/never tried  Amantadine caused loss of appetite and associated weight loss  IR C/L caused neck tightness like dyskinesias  Ropinirole and Pramipexole - developed dyskinesia on higher doses and he had balance issues and frequent falls.  Modafinil 200 mg- ineffective  Neupro patch x 2 weeks--is expensive. Not sure helped  Carbidopa/levodopa  mg ER 1 tab 4x/day- Higher doses=SE muscle tightness       Patient Health Questionnaire-2 Score: 0            Current Outpatient Medications:     atenolol (Tenormin) 50 mg tablet, Take 1 tablet (50 mg) by mouth once daily., Disp: 90 tablet, Rfl: 3    buPROPion XL (Wellbutrin XL) 150 mg 24 hr " tablet, Take 1 tablet (150 mg) by mouth once daily., Disp: 90 tablet, Rfl: 0    carbidopa-levodopa (Rytary) 23.75-95 mg capsule, Take 1 capsule by mouth 3 times a day., Disp: , Rfl:     fluticasone (Flonase) 50 mcg/actuation nasal spray, Administer 1 spray into each nostril once daily. Shake gently. Before first use, prime pump. After use, clean tip and replace cap., Disp: 16 g, Rfl: 11    sertraline (Zoloft) 100 mg tablet, Take 1 tablet (100 mg) by mouth once daily., Disp: 90 tablet, Rfl: 3    sertraline (Zoloft) 50 mg tablet, Take half tablet daily for 2 weeks, then one tablet daily., Disp: 90 tablet, Rfl: 1    simvastatin (Zocor) 10 mg tablet, Take 1 tablet (10 mg) by mouth once daily at bedtime., Disp: 100 tablet, Rfl: 3    traZODone (Desyrel) 50 mg tablet, Take 1 tablet (50 mg) by mouth as needed at bedtime for sleep., Disp: 90 tablet, Rfl: 3       Objective   Vitals:    24 1454 24 1458   BP: 131/80 129/83   BP Location: Right arm Right arm   Patient Position: Sitting Standing   BP Cuff Size: Adult Adult   Pulse: 87 89   Resp: 12    Weight: 51.7 kg (114 lb)                  Physical Exam    MDS UPDRS 1st Score: Motor Examination  Is the patient on medication for treating the symptoms of Parkinson's Disease?: Yes  Patients receiving medication for treating the symptoms of Parkinson's Disease, jessica the patient's clinical state.: On  Is the patient on Levodopa?: Yes  Minutes since last Levodopa dose: 2.5hrs (1 cap 95mg RytaryY  Speech: 2  Facial Expression: 3  Rigidty Neck: 2  Rigidty RUE: 3  Rigidity - LUE: 2  Rigidity RLE: 2  Rigidity LLE: 2  Finger Tapping Right Hand: 2  Finger Tapping Left Hand: 3  Hand Movements- Right Hand: 0  Hand Movements- Left Hand: 1  Pronatiaon-Supination Movments - Right Hand: 1  Pronatiaon-Supination Movments Left Hand: 2  Toe Tapping Right Foot: 1  Toe Tapping - Left Foot: 3  Leg Agility - Right Le  Leg Agility - Left leg: 3  Arising from Chair: 2  Gait: 2  Freezing  of Gait: 0  Postural Stability: 2  Posture: 2  Global Spontanteity of Movment ( Body Bradykinesia): 3  Postural Tremor - Right Hand: 1  Postural Tremor - Left hand: 0  Kinetic Tremor - Right hand: 1  Kinetic Tremor - Left hand: 0  Rest Tremor Amplitude - RUE: 2  Rest Tremor Amplitude - LUE: 0  Rest Tremor Amplitude - RLE: 1  Rest Tremor Amplitude - LLE: 0  Rest Tremor Amplitude - Lip/Jaw: 0  Constancy of Rest Tremor: 4  MDS UPDRS Total Score: 54  Were dyskinesias (chorea or dystonia) present during examination?: No    Assessment/Plan   Mr. Raghu Gonzalez is a 69 y.o. M with PD since 2013 here for follow up. Last visit, he was changed to Rytary and unable to tolerate 2 caps TID, is only on 1. 1 cap does not provide benefit, 2 caps causes him to feel more rigid. On LRT last visit he did have slight worsening sx/no improvement (off-51, on-56 w/ 2 tabs Sinemet). He is no longer intersted in DBS, not even just for tremor. Had d/w Dr. Castillo repeating Rytary 4-6tabs for repeat of the 95mg but he does not tolerate 2 caps.       PLAN  -Stop Rytary  -Start Crexont (samples provided and updated in log)  -[facility] PT  -Consider Gocovrri 137mg 1 cap nightly x 1 week then 2 caps nightly thereafter next (samples provided and updated in log)      Diagnoses and all orders for this visit:  Parkinson's disease without dyskinesia or fluctuating manifestations    #Try U-step walker when you see Dr. Castillo at [facility]     #Consult [facility] for in home PT    #Stop Rytary and start Crexont 140mg 1 cap 2 times a day    Dose may need adjusted.  If not enough Crexont (slow, tremors, stiff, worsening of balance/walking), let me know and we can increase  If too much Crexont (hallucinations, dyskinesia, sedation, dizziness), let me know and we can decrease it    Same side effects possible as with carbidopa-levodopa---Some side effects you may experience include: sleepiness, nausea, constipation, dizziness, hallucinations, or involuntary  wiggling movements. If you experience any side effects please call our clinic for further instructions.      #Even if you do not like Crexont or not helpful, no need to restart Rytary since 1 cap not helping and 2 caps made you feel stiffer    Could restart 1 tab controlled release Carbidopa-levodopa 25/100mg 4 times a day again but you did not feel that helped either    #Next, consider Gocovvri (extended release amantadine)---may help with tremors and freezing of gait    Potential side effects include but are not limited to: swelling of extremities, dizziness, hallucinations, dry mouth, constipation. Please let me know if these or any others occur.     #Follow up as scheduled with Dr. Anna Chen, NP-C  Adult/Gerontological Nurse Practitioner   [facility], Department of Neurology  Neurological [place]  [facility]  [address] [place] [address]  Phone: [phone]  Fax: [phone]

## 2024-11-13 NOTE — PATIENT INSTRUCTIONS
#Try U-step walker when you see Dr. Castillo at [facility]     #Consult [facility] for [facility]    #Stop Rytary and start Crexont     Dose may need adjusted.  If not enough Crexont (slow, tremors, stiff, worsening of balance/walking), let me know and we can increase  If too much Crexont (hallucinations, dyskinesia, sedation, dizziness), let me know and we can decrease it    Same side effects possible as with carbidopa-levodopa---Some side effects you may experience include: sleepiness, nausea, constipation, dizziness, hallucinations, or involuntary wiggling movements. If you experience any side effects please call our clinic for further instructions.      #Even if you do not like Crexont or not helpful, no need to restart Rytary since 1 cap not helping and 2 caps made you feel stiffer    Could restart 1 tab controlled release Carbidopa-levodopa 25/100mg 4 times a day again but you did not feel that helped either    #Next, consider Gocovvri (extended release amantadine)---may help with tremors and freezing of gait    Potential side effects include but are not limited to: swelling of extremities, dizziness, hallucinations, dry mouth, constipation. Please let me know if these or any others occur.     #Follow up as scheduled with Dr. Anna Chen, NP-C  Adult/Gerontological Nurse Practitioner   [facility], Department of Neurology  Neurological [place]  [facility]  [address] [place] [address]  Phone: [phone]  Fax: [phone]

## 2024-11-27 ENCOUNTER — APPOINTMENT (OUTPATIENT)
Dept: PRIMARY CARE | Facility: CLINIC | Age: 69
End: 2024-11-27
Payer: MEDICARE

## 2024-12-03 ENCOUNTER — APPOINTMENT (OUTPATIENT)
Dept: PRIMARY CARE | Facility: CLINIC | Age: 69
End: 2024-12-03
Payer: MEDICARE

## 2024-12-03 VITALS
HEIGHT: 64 IN | SYSTOLIC BLOOD PRESSURE: 139 MMHG | DIASTOLIC BLOOD PRESSURE: 80 MMHG | OXYGEN SATURATION: 96 % | BODY MASS INDEX: 19.81 KG/M2 | WEIGHT: 116 LBS | HEART RATE: 86 BPM

## 2024-12-03 DIAGNOSIS — Z12.12 SCREENING FOR COLORECTAL CANCER: ICD-10-CM

## 2024-12-03 DIAGNOSIS — Z87.891 PERSONAL HISTORY OF TOBACCO USE, PRESENTING HAZARDS TO HEALTH: ICD-10-CM

## 2024-12-03 DIAGNOSIS — N40.1 BENIGN PROSTATIC HYPERPLASIA WITH NOCTURIA: ICD-10-CM

## 2024-12-03 DIAGNOSIS — I10 BENIGN ESSENTIAL HTN: ICD-10-CM

## 2024-12-03 DIAGNOSIS — Z00.00 ROUTINE GENERAL MEDICAL EXAMINATION AT HEALTH CARE FACILITY: Primary | ICD-10-CM

## 2024-12-03 DIAGNOSIS — E78.2 COMBINED HYPERLIPIDEMIA: ICD-10-CM

## 2024-12-03 DIAGNOSIS — E46 PROTEIN-CALORIE MALNUTRITION, UNSPECIFIED SEVERITY (MULTI): ICD-10-CM

## 2024-12-03 DIAGNOSIS — R35.1 BENIGN PROSTATIC HYPERPLASIA WITH NOCTURIA: ICD-10-CM

## 2024-12-03 DIAGNOSIS — Z12.11 SCREENING FOR COLORECTAL CANCER: ICD-10-CM

## 2024-12-03 PROBLEM — I70.203 ATHEROSCLEROSIS OF NATIVE ARTERY OF BOTH LOWER EXTREMITIES, WITH UNSPECIFIED PRESENCE OF CLINICAL MANIFESTATION (CMS-HCC): Status: RESOLVED | Noted: 2023-11-27 | Resolved: 2024-12-03

## 2024-12-03 PROBLEM — F19.959: Status: RESOLVED | Noted: 2024-08-29 | Resolved: 2024-12-03

## 2024-12-03 PROCEDURE — G0446 INTENS BEHAVE THER CARDIO DX: HCPCS | Performed by: INTERNAL MEDICINE

## 2024-12-03 PROCEDURE — G0296 VISIT TO DETERM LDCT ELIG: HCPCS | Performed by: INTERNAL MEDICINE

## 2024-12-03 PROCEDURE — G0439 PPPS, SUBSEQ VISIT: HCPCS | Performed by: INTERNAL MEDICINE

## 2024-12-03 PROCEDURE — 3075F SYST BP GE 130 - 139MM HG: CPT | Performed by: INTERNAL MEDICINE

## 2024-12-03 PROCEDURE — 3079F DIAST BP 80-89 MM HG: CPT | Performed by: INTERNAL MEDICINE

## 2024-12-03 PROCEDURE — 1036F TOBACCO NON-USER: CPT | Performed by: INTERNAL MEDICINE

## 2024-12-03 PROCEDURE — 1160F RVW MEDS BY RX/DR IN RCRD: CPT | Performed by: INTERNAL MEDICINE

## 2024-12-03 PROCEDURE — 99214 OFFICE O/P EST MOD 30 MIN: CPT | Performed by: INTERNAL MEDICINE

## 2024-12-03 PROCEDURE — G0442 ANNUAL ALCOHOL SCREEN 15 MIN: HCPCS | Performed by: INTERNAL MEDICINE

## 2024-12-03 PROCEDURE — 3008F BODY MASS INDEX DOCD: CPT | Performed by: INTERNAL MEDICINE

## 2024-12-03 PROCEDURE — 1159F MED LIST DOCD IN RCRD: CPT | Performed by: INTERNAL MEDICINE

## 2024-12-03 PROCEDURE — 1170F FXNL STATUS ASSESSED: CPT | Performed by: INTERNAL MEDICINE

## 2024-12-03 RX ORDER — CARBIDOPA AND LEVODOPA 35; 140 MG/1; MG/1
1 CAPSULE, EXTENDED RELEASE ORAL 2 TIMES DAILY
COMMUNITY

## 2024-12-03 ASSESSMENT — ACTIVITIES OF DAILY LIVING (ADL)
MANAGING_FINANCES: INDEPENDENT
DOING_HOUSEWORK: INDEPENDENT
GROCERY_SHOPPING: NEEDS ASSISTANCE
DRESSING: INDEPENDENT
TAKING_MEDICATION: INDEPENDENT
BATHING: INDEPENDENT

## 2024-12-03 ASSESSMENT — PATIENT HEALTH QUESTIONNAIRE - PHQ9
SUM OF ALL RESPONSES TO PHQ9 QUESTIONS 1 AND 2: 0
1. LITTLE INTEREST OR PLEASURE IN DOING THINGS: NOT AT ALL
2. FEELING DOWN, DEPRESSED OR HOPELESS: NOT AT ALL

## 2024-12-03 ASSESSMENT — COLUMBIA-SUICIDE SEVERITY RATING SCALE - C-SSRS
1. IN THE PAST MONTH, HAVE YOU WISHED YOU WERE DEAD OR WISHED YOU COULD GO TO SLEEP AND NOT WAKE UP?: NO
2. HAVE YOU ACTUALLY HAD ANY THOUGHTS OF KILLING YOURSELF?: NO
6. HAVE YOU EVER DONE ANYTHING, STARTED TO DO ANYTHING, OR PREPARED TO DO ANYTHING TO END YOUR LIFE?: NO

## 2024-12-03 NOTE — PROGRESS NOTES
Subjective   Patient ID: Raghu Gonzalez is a 69 y.o. male who presents for Medicare Annual Wellness Visit Subsequent.    Past medical history most significant for Parkinson's disease, followed by Dr. Castillo. He also has a history of well-controlled hypertension, borderline hyperlipidemia, and right-sided Bell's palsy.     Interim:  - has been following with neurology for parkinson disease, most recently they trialed Rytary and he did not tolerate, then started Crexont and discussed [facility]  - psychiatry titrating down on wellbutrin and up on sertraline, continue trazodone     He does not like getting out very much, partially due to mood/anxiety and partially due to physical difficulty.  Not currently active with PT or [facility].    Recently started on a different formulation of Parkinson's medication and finds that he gets sleepy and lightheaded on it, does help a little bit.    Appetite is stable, weights have been relatively stable since last year.    Continues to have chronic rhinitis, does not recall prior recommendations.  However now more bothered by frequent sensation that he has mucus in his throat and usually has a difficult time bringing it up.  This causes a frequent moist cough, when he does bring it up it is typically thick sticky sputum.  He does not drink a lot of fluids, does not like to have to go to the bathroom too often.    Previously evaluated for BPH but had not been too bothered with symptoms.  Now he is becoming more bothered by frequent urination, maybe only a little bit more frequent but affecting his sleep and he does want to know about alternatives.  Currently getting up every 2-3 hours overnight.  No dysuria, only occasional urgency.  No incontinence.    Finally, says he has been having softer stools ever since he had COVID-19 in February of this past year.  No diarrhea or loose stool, mainly complains that it is messy and requires a lot of toilet paper to get himself cleaned  "up.  Occasionally more normal, he does try to eat a lot of bran flakes and lentils.          Providers:  Neurology: Dr. Nel Castillo  Geriatric psychiatry: Dr. Nixon        Objective   /80   Pulse 86   Ht 1.626 m (5' 4\")   Wt 52.6 kg (116 lb)   SpO2 96%   BMI 19.91 kg/m²     Gen: NAD, pleasant, A&;Ox3  HEENT: PERRL, EOMI, dry MM with tacky dry mucous seen on the posterior OP   Neck: supple, no thyromegaly, no JVD, normal carotid upstroke  Pulm: lungs CTAB, good air movement  CV: RRR, no m/r/g, 2+ DP pulses  Abd: NABS, soft, NT, ND no HSM  Ext: no peripheral edema      Assessment/Plan     Soft stool: Trial of increasing/supplementing with psyllium husk    Chronic rhinitis with PND and cough:  -Nasal ICS, fluids, nasal saline, guaifenesin       Hx Unintentional weight loss: stabilized/improved, CTM  -Continue supplemental protein drinks if needed     Has Depression and anxiety. Following with Dr. Nixon.  See med rec for current dosing     Hypertension and hyperlipidemia: elevated BP recently, mildly elevated office today.  - continue atenolol 50 mg daily  - pt reports taking simvastatin 40 mg only 2x per week, lipid panel okay in 2023       BPH with nocturia/PSA: Worsening and more bothersome symptoms  -I discussed my hesitation to immediately start him on an alpha-blocker given potential for orthostasis and his recent lightheadedness with changing his Parkinson's medication; similarly I am concerned about cognitive and other interactions with finasteride  - PSA   -Referral to Dr. English for evaluation and consideration of treatment options        Health maintenance  -Last colonoscopy: Cologuard normal in NOV 2020. Repeat overdue, ordered.   -Smoking history: Greater than 20-pack-year, quit 1997, ordered low dose CT and AAA screening last year, unsure if he wants to complete at this time, discussed  -Counseled regarding diet and exercise  -Immunizations: Continue annual influenza, received COVID, " recommend Shingrix (not interested)  -Followup annually and as needed    5-10 minutes were spent on screening for Alcohol misuse.  The 10-year ASCVD risk score (Aliza SON, et al., 2019) is: 22.7%    Values used to calculate the score:      Age: 69 years      Sex: Male      Is Non- : No      Diabetic: No      Tobacco smoker: No      Systolic Blood Pressure: 139 mmHg      Is BP treated: Yes      HDL Cholesterol: 53.9 mg/dL      Total Cholesterol: 166 mg/dL  Cardiovascular risk discussed and modifiable risk factors addressed.  Discussion included options of pharmaceutical interventions and recommended lifestyle modifications, including nutritional choices, exercise, and elimination of habits contributing to risk.   >15 minutes spent on assessment and discussion.  I discussed smoking history/status to determine this patient meets criteria for lung cancer screening with low-dose CT scan; using shared decision making we determined the patient will benefit from a screening, including a discussion of benefits and harms of screening, follow-up diagnostic testing, overdiagnosis, false positive rates, and total radiation exposure; I counseled the patient on the importance of adhering to annual lung cancer low-dose CT screening, the impact of comorbidities, and his or her ability or willingness to undergo diagnosis and treatment if abnormalities are discovered.

## 2024-12-03 NOTE — PATIENT INSTRUCTIONS
- For chronic runny nose, I would try Nasacort or Flonase.  That is where most of the mucus and phlegm is coming from.  It is difficult to cough up because it is rather thick and dry and increasing fluids, utilizing a humidifier and nasal saline spray can help with this.  Guaifenesin can also break up mucus a bit.  -For the stool changes we would like you to try increasing fiber intake by supplementing with psyllium husk supplement such as Benefiber or Metamucil  -We will refer you to urology for evaluation of your BPH  -Please get labs completed this week

## 2024-12-06 DIAGNOSIS — I10 PRIMARY HYPERTENSION: ICD-10-CM

## 2024-12-06 RX ORDER — ATENOLOL 50 MG/1
50 TABLET ORAL DAILY
Qty: 90 TABLET | Refills: 0 | Status: SHIPPED | OUTPATIENT
Start: 2024-12-06

## 2024-12-09 ENCOUNTER — LAB (OUTPATIENT)
Dept: LAB | Facility: LAB | Age: 69
End: 2024-12-09
Payer: MEDICARE

## 2024-12-09 ENCOUNTER — TELEPHONE (OUTPATIENT)
Dept: NEUROLOGY | Facility: CLINIC | Age: 69
End: 2024-12-09

## 2024-12-09 DIAGNOSIS — G20.B1 PARKINSON'S DISEASE WITH DYSKINESIA, UNSPECIFIED WHETHER MANIFESTATIONS FLUCTUATE: ICD-10-CM

## 2024-12-09 DIAGNOSIS — E78.2 COMBINED HYPERLIPIDEMIA: ICD-10-CM

## 2024-12-09 DIAGNOSIS — N40.1 BENIGN PROSTATIC HYPERPLASIA WITH NOCTURIA: ICD-10-CM

## 2024-12-09 DIAGNOSIS — R35.1 BENIGN PROSTATIC HYPERPLASIA WITH NOCTURIA: ICD-10-CM

## 2024-12-09 DIAGNOSIS — I10 BENIGN ESSENTIAL HTN: ICD-10-CM

## 2024-12-09 DIAGNOSIS — Z00.00 ROUTINE GENERAL MEDICAL EXAMINATION AT HEALTH CARE FACILITY: ICD-10-CM

## 2024-12-09 LAB
ALBUMIN SERPL BCP-MCNC: 4.4 G/DL (ref 3.4–5)
ALP SERPL-CCNC: 61 U/L (ref 33–136)
ALT SERPL W P-5'-P-CCNC: 33 U/L (ref 10–52)
ANION GAP SERPL CALC-SCNC: 12 MMOL/L (ref 10–20)
APPEARANCE UR: CLEAR
AST SERPL W P-5'-P-CCNC: 23 U/L (ref 9–39)
BASOPHILS # BLD AUTO: 0.04 X10*3/UL (ref 0–0.1)
BASOPHILS NFR BLD AUTO: 0.5 %
BILIRUB SERPL-MCNC: 0.5 MG/DL (ref 0–1.2)
BILIRUB UR STRIP.AUTO-MCNC: NEGATIVE MG/DL
BUN SERPL-MCNC: 22 MG/DL (ref 6–23)
CALCIUM SERPL-MCNC: 9.5 MG/DL (ref 8.6–10.6)
CHLORIDE SERPL-SCNC: 103 MMOL/L (ref 98–107)
CHOLEST SERPL-MCNC: 166 MG/DL (ref 0–199)
CHOLESTEROL/HDL RATIO: 2.7
CO2 SERPL-SCNC: 31 MMOL/L (ref 21–32)
COLOR UR: YELLOW
CREAT SERPL-MCNC: 1.14 MG/DL (ref 0.5–1.3)
EGFRCR SERPLBLD CKD-EPI 2021: 69 ML/MIN/1.73M*2
EOSINOPHIL # BLD AUTO: 0.19 X10*3/UL (ref 0–0.4)
EOSINOPHIL NFR BLD AUTO: 2.5 %
ERYTHROCYTE [DISTWIDTH] IN BLOOD BY AUTOMATED COUNT: 12.4 % (ref 11.5–14.5)
GLUCOSE SERPL-MCNC: 109 MG/DL (ref 74–99)
GLUCOSE UR STRIP.AUTO-MCNC: NORMAL MG/DL
HCT VFR BLD AUTO: 43.2 % (ref 41–52)
HDLC SERPL-MCNC: 61.1 MG/DL
HGB BLD-MCNC: 14 G/DL (ref 13.5–17.5)
HOLD SPECIMEN: NORMAL
IMM GRANULOCYTES # BLD AUTO: 0.04 X10*3/UL (ref 0–0.5)
IMM GRANULOCYTES NFR BLD AUTO: 0.5 % (ref 0–0.9)
KETONES UR STRIP.AUTO-MCNC: NEGATIVE MG/DL
LDLC SERPL CALC-MCNC: 89 MG/DL
LEUKOCYTE ESTERASE UR QL STRIP.AUTO: NEGATIVE
LYMPHOCYTES # BLD AUTO: 1.86 X10*3/UL (ref 0.8–3)
LYMPHOCYTES NFR BLD AUTO: 24.3 %
MCH RBC QN AUTO: 29.7 PG (ref 26–34)
MCHC RBC AUTO-ENTMCNC: 32.4 G/DL (ref 32–36)
MCV RBC AUTO: 92 FL (ref 80–100)
MONOCYTES # BLD AUTO: 0.49 X10*3/UL (ref 0.05–0.8)
MONOCYTES NFR BLD AUTO: 6.4 %
MUCOUS THREADS #/AREA URNS AUTO: NORMAL /LPF
NEUTROPHILS # BLD AUTO: 5.04 X10*3/UL (ref 1.6–5.5)
NEUTROPHILS NFR BLD AUTO: 65.8 %
NITRITE UR QL STRIP.AUTO: NEGATIVE
NON HDL CHOLESTEROL: 105 MG/DL (ref 0–149)
NRBC BLD-RTO: 0 /100 WBCS (ref 0–0)
PH UR STRIP.AUTO: 7 [PH]
PLATELET # BLD AUTO: 181 X10*3/UL (ref 150–450)
POTASSIUM SERPL-SCNC: 4.4 MMOL/L (ref 3.5–5.3)
PROT SERPL-MCNC: 6.3 G/DL (ref 6.4–8.2)
PROT UR STRIP.AUTO-MCNC: NORMAL MG/DL
PSA SERPL-MCNC: 1.56 NG/ML
RBC # BLD AUTO: 4.71 X10*6/UL (ref 4.5–5.9)
RBC # UR STRIP.AUTO: NEGATIVE /UL
RBC #/AREA URNS AUTO: NORMAL /HPF
SODIUM SERPL-SCNC: 142 MMOL/L (ref 136–145)
SP GR UR STRIP.AUTO: 1.02
TRIGL SERPL-MCNC: 81 MG/DL (ref 0–149)
UROBILINOGEN UR STRIP.AUTO-MCNC: NORMAL MG/DL
VLDL: 16 MG/DL (ref 0–40)
WBC # BLD AUTO: 7.7 X10*3/UL (ref 4.4–11.3)
WBC #/AREA URNS AUTO: NORMAL /HPF

## 2024-12-09 PROCEDURE — 80061 LIPID PANEL: CPT

## 2024-12-09 PROCEDURE — 84153 ASSAY OF PSA TOTAL: CPT

## 2024-12-09 PROCEDURE — 81001 URINALYSIS AUTO W/SCOPE: CPT

## 2024-12-09 PROCEDURE — 36415 COLL VENOUS BLD VENIPUNCTURE: CPT

## 2024-12-09 PROCEDURE — 85025 COMPLETE CBC W/AUTO DIFF WBC: CPT

## 2024-12-09 PROCEDURE — 80053 COMPREHEN METABOLIC PANEL: CPT

## 2024-12-09 RX ORDER — CARBIDOPA AND LEVODOPA 35; 140 MG/1; MG/1
1 CAPSULE, EXTENDED RELEASE ORAL 2 TIMES DAILY
Qty: 60 EACH | Refills: 6 | Status: SHIPPED | OUTPATIENT
Start: 2024-12-09 | End: 2025-01-08

## 2024-12-09 NOTE — TELEPHONE ENCOUNTER
Prior authorization approved  Payer: [employer] (Formerly [employer]) - Medicare Case ID: [id number]  Note from payer: CaseId:[id number];Status:Approved;Review Type:Prior Auth;Coverage Start Date:11/09/2024;Coverage End Date:12/09/2025;  Approval Details    Authorized from November 9, 2024 to December 9, 2025

## 2025-01-15 ENCOUNTER — APPOINTMENT (OUTPATIENT)
Dept: UROLOGY | Facility: HOSPITAL | Age: 70
End: 2025-01-15
Payer: MEDICARE

## 2025-01-16 ENCOUNTER — APPOINTMENT (OUTPATIENT)
Dept: NEUROLOGY | Facility: HOSPITAL | Age: 70
End: 2025-01-16
Payer: MEDICARE

## 2025-01-24 DIAGNOSIS — F41.8 DEPRESSION WITH ANXIETY: ICD-10-CM

## 2025-01-24 RX ORDER — BUPROPION HYDROCHLORIDE 150 MG/1
150 TABLET ORAL DAILY
Qty: 90 TABLET | Refills: 1 | Status: SHIPPED | OUTPATIENT
Start: 2025-01-24

## 2025-03-06 DIAGNOSIS — I10 PRIMARY HYPERTENSION: ICD-10-CM

## 2025-03-07 RX ORDER — ATENOLOL 50 MG/1
50 TABLET ORAL DAILY
Qty: 90 TABLET | Refills: 0 | Status: SHIPPED | OUTPATIENT
Start: 2025-03-07

## 2025-05-01 ENCOUNTER — LAB REQUISITION (OUTPATIENT)
Dept: DERMATOPATHOLOGY | Facility: CLINIC | Age: 70
End: 2025-05-01
Payer: MEDICARE

## 2025-05-01 DIAGNOSIS — C43.62 MALIGNANT MELANOMA OF LEFT UPPER LIMB, INCLUDING SHOULDER: ICD-10-CM

## 2025-05-01 PROCEDURE — 88321 CONSLTJ&REPRT SLD PREP ELSWR: CPT | Performed by: DERMATOLOGY

## 2025-05-02 LAB
PATH REPORT.FINAL DX SPEC: NORMAL
PATH REPORT.GROSS SPEC: NORMAL
PATH REPORT.RELEVANT HX SPEC: NORMAL
PATH REPORT.TOTAL CANCER: NORMAL
PATHOLOGY SYNOPTIC REPORT: NORMAL

## 2025-05-04 DIAGNOSIS — C43.9 MELANOMA OF SKIN (MULTI): ICD-10-CM

## 2025-05-05 ENCOUNTER — TUMOR BOARD CONFERENCE (OUTPATIENT)
Dept: HEMATOLOGY/ONCOLOGY | Facility: HOSPITAL | Age: 70
End: 2025-05-05
Payer: MEDICARE

## 2025-05-05 NOTE — TUMOR BOARD NOTE
General Patient Information  Name:  Raghu Gonzalez  Evaluation #:  1  Conference Date:  5/5/2025  YOB: 1955  MRN:  [id number]  Program Physician(s):  Tao Lo  Referring Physician(s):  Nany Garcia      Summary   Stage:  cIB ([id number])   Melanoma 5 year survival: 97%    Assessment:  Left proximal radial dorsal forearm showing a non-ulcerated melanoma, lentigo maligna type, Breslow: 0.9 mm, present on the peripheral margin.    Recommendation:  WLE with 1 cm margins and consider SLNB.    Review Multidisciplinary Cutaneous Oncology Conference recommendation with patient.  Continue routine follow up and total body skin exams with Dermatology.    Follow Up:  Nany Jc      History and Physical Exam  Dermatologic History:   69 y.o. male with a biopsy of the left proximal radial dorsal forearm on 4/21/25 showing a non-ulcerated melanoma, lentigo maligna type, Breslow: 0.9 mm, present on the peripheral margin.    Past Medical History:  Medical History[1]    Family History of DNS/MM:  Unknown    Skin:  Not yet seen by  provider.    Lymphatic:  Not yet seen by  provider.      Pathology  [id number]  04/21/2025   Component    FINAL DIAGNOSIS   4 SLIDES, [facility], # (BX: 04/21/2025)     SKIN, LEFT PROX RADIAL DORSAL FOREARM, BIOPSY:  MALIGNANT MELANOMA, BRESLOW THICKNESS 0.9 MM, PRESENT ON THE PERIPHERAL MARGIN, SEE NOTE.     Note: Microscopic examination reveals a specimen that extends into the mid dermis. There is dense solar elastosis. This specimen may be partially tangentially sectioned. There is a proliferation of nested and single atypical melanocytes along the dermal-epidermal junction with areas of clefting. There are nests of atypical melanocytes in the dermis with dense underlying solar elastosis. The melanocytes stain with antibodies against SOX-10.     ** Electronically signed out by Mitesh Servin MD **            Electronically  signed by Mitesh Servin MD on 5/2/2025 at 1548 EDT       SPECIMEN   Procedure  Biopsy, shave   Specimen Laterality  Left   TUMOR   Tumor Site  Skin of upper limb and shoulder: Left prox radial dorsal forearm        Histologic Type  Lentigo maligna melanoma   Maximum Tumor (Breslow) Thickness (Millimeters)  0.9 mm   Ulceration  Not identified   Anatomic (Magan) Level  III (melanoma fills and expands papillary dermis)   Mitotic Rate  None identified   Microsatellite(s)  Not identified   Lymphovascular Invasion  Not identified   Neurotropism  Not identified   Tumor-Infiltrating Lymphocytes  Present, nonbrisk   Tumor Regression  Not identified   MARGINS     Margin Status for Invasive Melanoma  Invasive melanoma present at margin   Margin(s) Involved by Invasive Melanoma  Peripheral   Margin Status for Melanoma in situ  Melanoma in situ present at margin   Margin(s) Involved by Melanoma in Situ  Peripheral   PATHOLOGIC STAGE CLASSIFICATION (pTNM, AJCC 8th Edition)     pT Category  pT1b       Radiology  None.      Clinical Images  4/21/25           [1]   Past Medical History:  Diagnosis Date    Elevated prostate specific antigen (PSA)     Elevated prostate specific antigen (PSA)    Olecranon bursitis, left elbow 11/17/2020    Olecranon bursitis of left elbow    Other conditions influencing health status 02/19/2018    Tear of left supraspinatus tendon, subsequent encounter    Personal history of other diseases of the circulatory system     History of hypertension    Personal history of other diseases of the nervous system and sense organs 03/28/2017    History of Bell's palsy    Personal history of other endocrine, nutritional and metabolic disease     History of hypercholesterolemia    Personal history of other mental and behavioral disorders     History of depression

## 2025-05-27 ENCOUNTER — OFFICE VISIT (OUTPATIENT)
Dept: SURGICAL ONCOLOGY | Facility: CLINIC | Age: 70
End: 2025-05-27
Payer: MEDICARE

## 2025-05-27 VITALS
OXYGEN SATURATION: 97 % | RESPIRATION RATE: 18 BRPM | HEART RATE: 88 BPM | TEMPERATURE: 98.2 F | BODY MASS INDEX: 20.22 KG/M2 | DIASTOLIC BLOOD PRESSURE: 87 MMHG | SYSTOLIC BLOOD PRESSURE: 146 MMHG | WEIGHT: 117.8 LBS

## 2025-05-27 DIAGNOSIS — C43.9 MELANOMA OF SKIN (MULTI): Primary | ICD-10-CM

## 2025-05-27 DIAGNOSIS — G20.B1 DYSKINESIA DUE TO PARKINSON'S DISEASE (MULTI): ICD-10-CM

## 2025-05-27 PROCEDURE — 3077F SYST BP >= 140 MM HG: CPT | Performed by: SURGERY

## 2025-05-27 PROCEDURE — 3079F DIAST BP 80-89 MM HG: CPT | Performed by: SURGERY

## 2025-05-27 PROCEDURE — 99205 OFFICE O/P NEW HI 60 MIN: CPT | Performed by: SURGERY

## 2025-05-27 PROCEDURE — 1159F MED LIST DOCD IN RCRD: CPT | Performed by: SURGERY

## 2025-05-27 PROCEDURE — 99215 OFFICE O/P EST HI 40 MIN: CPT | Mod: 57 | Performed by: SURGERY

## 2025-05-27 PROCEDURE — 1126F AMNT PAIN NOTED NONE PRSNT: CPT | Performed by: SURGERY

## 2025-05-27 ASSESSMENT — PAIN SCALES - GENERAL: PAINLEVEL_OUTOF10: 0-NO PAIN

## 2025-05-27 NOTE — PROGRESS NOTES
History and Physical    Referring Provider:  Nany Vargas MD Daniel Fleksher, MD    Chief Complaint:  No chief complaint on file.  Left forearm melanoma    History of Present Illness:  This is a 69 y.o. 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 He had an unusual irregularly pigmented skin lesion in this area that was biopsied with a shave biopsy targeting the central and darkest portion of the lesion. This had positive margins with MIS on the peripheral margins. Lentigo maligna subtype    The patient has had Parkinson's disease for 13 years that he has managed well.     Past Medical History:  Past Medical History:  No date: Elevated prostate specific antigen (PSA)      Comment:  Elevated prostate specific antigen (PSA)  11/17/2020: Olecranon bursitis, left elbow      Comment:  Olecranon bursitis of left elbow  02/19/2018: Other conditions influencing health status      Comment:  Tear of left supraspinatus tendon, subsequent encounter  No date: Personal history of other diseases of the circulatory system      Comment:  History of hypertension  03/28/2017: Personal history of other diseases of the nervous system   and sense organs      Comment:  History of Bell's palsy  No date: Personal history of other endocrine, nutritional and   metabolic disease      Comment:  History of hypercholesterolemia  No date: Personal history of other mental and behavioral disorders      Comment:  History of depression     Past Surgical History:  Past Surgical History:  3/14/2015: MR HEAD ANGIO WO IV CONTRAST      Comment:  MR HEAD ANGIO WO IV CONTRAST 3/14/2015 [facility]                LEGACY  3/14/2015: MR NECK ANGIO WO IV CONTRAST      Comment:  MR NECK ANGIO WO IV CONTRAST 3/14/2015 [facility]                LEGACY  03/03/2019: OTHER SURGICAL HISTORY      Comment:  Rotator cuff repair     Medications:  Current Outpatient Medications   Medication Instructions     atenolol (TENORMIN) 50 mg, oral, Daily    buPROPion XL (WELLBUTRIN XL) 150 mg, oral, Daily    carbidopa-levodopa (Crexont)  mg capsule,IR -extend rel,biphase 1 capsule, oral, 2 times daily    fluticasone (Flonase) 50 mcg/actuation nasal spray 1 spray, Each Nostril, Daily, Shake gently. Before first use, prime pump. After use, clean tip and replace cap.    sertraline (Zoloft) 50 mg tablet Take half tablet daily for 2 weeks, then one tablet daily.    sertraline (ZOLOFT) 100 mg, oral, Daily RT    simvastatin (ZOCOR) 10 mg, oral, Nightly    traZODone (DESYREL) 50 mg, oral, Nightly PRN        Allergies:  RX Allergies[1]     Family History:  Father with Lung Cancer    Social History:   reports that he quit smoking about 28 years ago. His smoking use included cigarettes. He has a 15 pack-year smoking history. He has never used smokeless tobacco. He reports that he does not currently use alcohol. He reports that he does not currently use drugs.   Lives in [place] with his wife    Review of Systems:  A complete 12 point review of systems was performed and is negative except as noted in the history of present illness.    Vital Signs:  Vitals:    05/27/25 1133   BP: 146/87   Pulse: 88   Resp: 18   Temp: 36.8 °C (98.2 °F)   SpO2: 97%        Physical Exam:  GEN: No acute distress, Healthy appearing  HEENT: Moist mucus membranes, normocephalic  CARDS: RRR  PULM: No respiratory distress  GI: Soft, non-distended  LYMPH: No palpable lymphadenopathy in the left epitrochlear and axillary basins  SKIN: left proximal dorsal forearm with a biopsy site within a pigmented patch with additional patterns of pigmentation encircling the index lesion up to 1cm away - uncertain if this represents MIS  NEURO: Parkinsonian tremor  EXT: No arm or leg swelling    Laboratory Values:  Lab Results   Component Value Date    WBC 7.7 12/09/2024    HGB 14.0 12/09/2024    HCT 43.2 12/09/2024    MCV 92 12/09/2024     12/09/2024        Chemistry   "  Lab Results   Component Value Date/Time     12/09/2024 0838    K 4.4 12/09/2024 0838     12/09/2024 0838    CO2 31 12/09/2024 0838    BUN 22 12/09/2024 0838    CREATININE 1.14 12/09/2024 0838    Lab Results   Component Value Date/Time    CALCIUM 9.5 12/09/2024 0838    ALKPHOS 61 12/09/2024 0838    AST 23 12/09/2024 0838    ALT 33 12/09/2024 0838    BILITOT 0.5 12/09/2024 0838           No results found for: \"PR1\"    PROCEDURE:   After obtaining verbal informed consent, the patient skin was cleansed with alcohol and anesthetized with 1% lidocaine. 2mm punch biopsies were taken from the radial and ulnar margins of the surrounding pigmentation as samples of this skin change. Specimens were placed in formalin and sent to dermatopathology. Pressure was held for hemostasis. Pressure dressings were placed under bandaids.     Assessment:  This is a 69 y.o. 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 No clinically palpable lymphadenectomy. Biopsy of surrounding pigmentation will help surgical planning. He is recommended for Wide excision with a 1cm margin and consideration of SLNB. He understands the risks, benefits, and alternatives to this approach. He also understands that if the punch biopsies show MIS, excision will also benefit from grafting to heal the wound. He is uncertain if he would want to pursue the SLNB    Plan:  -- Follow up punch biopsies  -- Will discuss by phone whether the patient will want [place] lymph node biopsy at the time of wide excision +/- skin grafting  -- The patient asked very appropriate questions that were answered to the best of my ability with the current information at hand. He knows to call with any questions or concerns that arise        Shan Ingram MD, MPH         [1] No Known Allergies    "

## 2025-05-29 ENCOUNTER — APPOINTMENT (OUTPATIENT)
Dept: NEUROLOGY | Facility: HOSPITAL | Age: 70
End: 2025-05-29
Payer: MEDICARE

## 2025-05-30 LAB
LAB AP ASR DISCLAIMER: NORMAL
LABORATORY COMMENT REPORT: NORMAL
PATH REPORT.FINAL DX SPEC: NORMAL
PATH REPORT.GROSS SPEC: NORMAL
PATH REPORT.RELEVANT HX SPEC: NORMAL
PATH REPORT.TOTAL CANCER: NORMAL

## 2025-06-02 ENCOUNTER — TUMOR BOARD CONFERENCE (OUTPATIENT)
Dept: HEMATOLOGY/ONCOLOGY | Facility: HOSPITAL | Age: 70
End: 2025-06-02
Payer: MEDICARE

## 2025-06-02 DIAGNOSIS — C43.9 MELANOMA OF SKIN (MULTI): Primary | ICD-10-CM

## 2025-06-02 NOTE — TUMOR BOARD NOTE
General Patient Information  Name:  Raghu Gonzalez  Evaluation #:  2  Conference Date:  06/02/2025  YOB: 1955  MRN:  [id number]  Program Physician(s):  Tao Lo  Referring Physician(s):  Nany Garcia      Summary   Stage:  cIB ([id number])   Melanoma 5 year survival: 97%    Assessment:  Left proximal radial dorsal forearm showing a non-ulcerated melanoma, lentigo maligna type, Breslow: 0.9 mm, present on the peripheral margin.    When seen by  provider, additional punch biopsies were obtained in the area surrounding the lesion, which aroused suspicion due to irregular pigmentation. Patient is being re-conferenced 6/2 to discuss pathology of these biopsies and determine whether they represent Mis or benign changes before proceeding with WLE/SLNB.    Recommendation:  WLE with 1 cm margins and consider SLNB.    Review Multidisciplinary Cutaneous Oncology Conference recommendation with patient.  Continue routine follow up and total body skin exams with Dermatology.    Follow Up:  Nany Jc      History and Physical Exam  Dermatologic History:   69 y.o. male with a biopsy of the left proximal radial dorsal forearm on 4/21/25 showing a non-ulcerated melanoma, lentigo maligna type, Breslow: 0.9 mm, present on the peripheral margin.    When seen by  provider, additional punch biopsies were obtained in the area surrounding the lesion, which aroused suspicion due to irregular pigmentation. Patient is being re-conferenced 6/2 to discuss pathology of these biopsies and determine whether they represent Mis or benign changes before proceeding with WLE/SLNB.    Past Medical History:  Medical History[1]    Family History of DNS/MM:  Unknown    Skin:  left proximal dorsal forearm with a biopsy site within a pigmented patch with additional patterns of pigmentation encircling the index lesion up to 1cm away - uncertain if this represents MIS.    Lymphatic:  No  palpable lymphadenopathy in the left epitrochlear and axillary basins       Pathology    Dermatopathology- DERM LAB: [id number]   Collected 5/27/2025 12:47  Component    FINAL DIAGNOSIS   A: SKIN, L FOREARM RADIAL, PUNCH BIOPSY:  ACTINIC DAMAGE WITH EPIDERMAL ATROPHY AND MILD MELANOCYTE HYPERPLASIA, SEE NOTE.     Note: Microscopic examination reveals a specimen that extends into the deep reticular dermis. There is moderate solar elastosis with epidermal atrophy and mild basal layer melanin pigmentation. Multiple step sections were performed. A SOX-10 stain reveals a mild increase in single melanocytes with small nuclei along the dermal-epidermal junction. All control slides stain appropriately.     These findings are not specific.     B: SKIN, L FOREARM ULNAR, PUNCH BIOPSY:  MELANOCYTE HYPERPLASIA WITH ACTINIC DAMAGE, SEE NOTE.     Note: Microscopic examination reveals a specimen that extends into the deep reticular dermis. There is dense solar elastosis with mild to moderate basal layer melanin pigmentation and flattening of the epidermis with atrophy. Multiple step sections were performed. A SOX-10 stain reveals a moderate increase in single melanocytes along the dermal-epidermal junction with rare pagetoid extension. All control slides stain appropriately.     These findings are not specific. Actinic melanocytosis cannot be excluded.     ** Electronically signed out by Mitesh Servin MD **            Electronically signed by Mitesh Servin MD on 5/30/2025 at 1505 EDT     ___________________________________________________________________________________________________    [id number]  04/21/2025   Component    FINAL DIAGNOSIS   4 SLIDES, [facility], # (BX: 04/21/2025)     SKIN, LEFT PROX RADIAL DORSAL FOREARM, BIOPSY:  MALIGNANT MELANOMA, BRESLOW THICKNESS 0.9 MM, PRESENT ON THE PERIPHERAL MARGIN, SEE NOTE.     Note: Microscopic examination reveals a specimen that extends into the mid dermis. There is  dense solar elastosis. This specimen may be partially tangentially sectioned. There is a proliferation of nested and single atypical melanocytes along the dermal-epidermal junction with areas of clefting. There are nests of atypical melanocytes in the dermis with dense underlying solar elastosis. The melanocytes stain with antibodies against SOX-10.     ** Electronically signed out by Mitesh Servin MD **            Electronically signed by Mitesh Servin MD on 5/2/2025 at 1548 EDT       SPECIMEN   Procedure  Biopsy, shave   Specimen Laterality  Left   TUMOR   Tumor Site  Skin of upper limb and shoulder: Left prox radial dorsal forearm        Histologic Type  Lentigo maligna melanoma   Maximum Tumor (Breslow) Thickness (Millimeters)  0.9 mm   Ulceration  Not identified   Anatomic (Magan) Level  III (melanoma fills and expands papillary dermis)   Mitotic Rate  None identified   Microsatellite(s)  Not identified   Lymphovascular Invasion  Not identified   Neurotropism  Not identified   Tumor-Infiltrating Lymphocytes  Present, nonbrisk   Tumor Regression  Not identified   MARGINS     Margin Status for Invasive Melanoma  Invasive melanoma present at margin   Margin(s) Involved by Invasive Melanoma  Peripheral   Margin Status for Melanoma in situ  Melanoma in situ present at margin   Margin(s) Involved by Melanoma in Situ  Peripheral   PATHOLOGIC STAGE CLASSIFICATION (pTNM, AJCC 8th Edition)     pT Category  pT1b       Radiology  None.      Clinical Images  4/21/25           [1]   Past Medical History:  Diagnosis Date    Elevated prostate specific antigen (PSA)     Elevated prostate specific antigen (PSA)    Olecranon bursitis, left elbow 11/17/2020    Olecranon bursitis of left elbow    Other conditions influencing health status 02/19/2018    Tear of left supraspinatus tendon, subsequent encounter    Personal history of other diseases of the circulatory system     History of hypertension    Personal history of other diseases  of the nervous system and sense organs 03/28/2017    History of Bell's palsy    Personal history of other endocrine, nutritional and metabolic disease     History of hypercholesterolemia    Personal history of other mental and behavioral disorders     History of depression

## 2025-06-03 DIAGNOSIS — I10 PRIMARY HYPERTENSION: ICD-10-CM

## 2025-06-04 RX ORDER — ATENOLOL 50 MG/1
50 TABLET ORAL DAILY
Qty: 90 TABLET | Refills: 0 | Status: SHIPPED | OUTPATIENT
Start: 2025-06-04

## 2025-06-26 ENCOUNTER — OFFICE VISIT (OUTPATIENT)
Dept: NEUROLOGY | Facility: HOSPITAL | Age: 70
End: 2025-06-26
Payer: MEDICARE

## 2025-06-26 VITALS
BODY MASS INDEX: 19.81 KG/M2 | WEIGHT: 116 LBS | SYSTOLIC BLOOD PRESSURE: 135 MMHG | HEIGHT: 64 IN | RESPIRATION RATE: 18 BRPM | TEMPERATURE: 97.1 F | HEART RATE: 88 BPM | DIASTOLIC BLOOD PRESSURE: 78 MMHG

## 2025-06-26 DIAGNOSIS — G20.B1 PARKINSON'S DISEASE WITH DYSKINESIA, UNSPECIFIED WHETHER MANIFESTATIONS FLUCTUATE: Primary | ICD-10-CM

## 2025-06-26 PROCEDURE — 3008F BODY MASS INDEX DOCD: CPT | Performed by: PSYCHIATRY & NEUROLOGY

## 2025-06-26 PROCEDURE — G2211 COMPLEX E/M VISIT ADD ON: HCPCS | Performed by: PSYCHIATRY & NEUROLOGY

## 2025-06-26 PROCEDURE — 3075F SYST BP GE 130 - 139MM HG: CPT | Performed by: PSYCHIATRY & NEUROLOGY

## 2025-06-26 PROCEDURE — 1036F TOBACCO NON-USER: CPT | Performed by: PSYCHIATRY & NEUROLOGY

## 2025-06-26 PROCEDURE — 1126F AMNT PAIN NOTED NONE PRSNT: CPT | Performed by: PSYCHIATRY & NEUROLOGY

## 2025-06-26 PROCEDURE — 99214 OFFICE O/P EST MOD 30 MIN: CPT | Performed by: PSYCHIATRY & NEUROLOGY

## 2025-06-26 PROCEDURE — 1160F RVW MEDS BY RX/DR IN RCRD: CPT | Performed by: PSYCHIATRY & NEUROLOGY

## 2025-06-26 PROCEDURE — 3078F DIAST BP <80 MM HG: CPT | Performed by: PSYCHIATRY & NEUROLOGY

## 2025-06-26 PROCEDURE — 1159F MED LIST DOCD IN RCRD: CPT | Performed by: PSYCHIATRY & NEUROLOGY

## 2025-06-26 RX ORDER — CARBIDOPA AND LEVODOPA 35; 140 MG/1; MG/1
1 CAPSULE, EXTENDED RELEASE ORAL 3 TIMES DAILY
Qty: 60 EACH | Refills: 6 | Status: SHIPPED | OUTPATIENT
Start: 2025-06-26 | End: 2025-07-26

## 2025-06-26 ASSESSMENT — UNIFIED PARKINSONS DISEASE RATING SCALE (UPDRS)
KINETIC_TREMOR_LEFTHAND: 1
AMPLITUDE_LLE: 0
GAIT: 2
CONSTANCY_TREMOR_ATREST: 4
PARKINSONS_MEDS: YES
AMPLITUDE_LIP_JAW: 1
POSTURAL_TREMOR_RIGHTHAND: 1
AMPLITUDE_RLE: 1
LEVODOPA: YES
PRONATION_SUPINATION_RIGHT: 2
AMPLITUDE_RUE: 2
HANDMOVEMENTS_RIGHT: 2
FINGER_TAPPING_LEFT: 3
RIGIDITY_NECK: 2
TOETAPPING_LEFT: 3
POSTURE: 2
CLINICAL_STATE: ON
RIGIDITY_RLE: 3
FREEZING_GAIT: 0
RIGIDITY_LUE: 3
RIGIDITY_LLE: 3
LEG_AGILITY_LEFT: 2
POSTURAL_TREMOR_LEFTHAND: 1
SPONTANEITY_OF_MOVEMENT: 3
TOTAL_SCORE: 67
PRONATION_SUPINATION_LEFT: 3
MINUTES_SINCE_LEVODOPA: 2.5 HOURS
LEG_AGILITY_RIGHT: 2
TOETAPPING_RIGHT: 3
FINGER_TAPPING_RIGHT: 2
RIGIDITY_RUE: 3
AMPLITUDE_LUE: 0
KINETIC_TREMOR_RIGHTHAND: 1
SPEECH: 2
FACIAL_EXPRESSION: 3
CHAIR_RISING_SCALE: 2
DYSKINESIAS_PRESENT: NO
POSTURAL_STABILITY: 2

## 2025-06-26 ASSESSMENT — PAIN SCALES - GENERAL: PAINLEVEL_OUTOF10: 0-NO PAIN

## 2025-06-26 NOTE — PATIENT INSTRUCTIONS
It was a pleasure to see you today!    Please increase Crexont to three times a day. New prescription sent    Please trial Gocovrii (extended release Amantadine) 1 tablet starting in 1 week and take at 5 pm.    Consider the infusion devices Vyalev and Onapgo.    [email or web address]    [email or web address]    Follow-up with Theron in 3 months, and Dr. Castillo in 6 months.    Let us know how things go, please!

## 2025-06-26 NOTE — PROGRESS NOTES
Subjective     Raghu Gonzalez is a right handed  69 y.o. year old male who presents with Parkinson's Disease. Patient is accompanied by: spouse  Visit type: follow up visit     Last visit was 11/2024 with Theron Chen at which time Rytary was stopped and patient was started on Crexont and prescribed [facility].  Levodopa response test without improvement in symptoms in July 2024.    Current issues: Today presents with his wife. States the tremors are still his biggest concern. Feels the Crexont has been the best medication he has been on in terms of how he has tolerated it, but he still finds it worsens his tremors and rigidity in the first couple hours.    Current PD meds:  Crexont  mg IR, 1 tablet twice daily (7am - 3pm). Within the first 2 hours of taking the medication, his tremors and rigidity worsens, but after that it improves. He feels the crexont is the best medication he has been on, but still leaves something to be desired.     He wakes up feeling great after not having taken any medication since 3 pm the day prior. If he doesn't take his Crexont in the morning, he'll begin to have worsening tremor.    Review of Motor symptoms:  Tremor:  Location- R sided                 Rest/postural/action- rest, more so than prior  Stiffness/rigidity: Experiences 30 minutes after taking C/L  Slowness:  Right sided mostly.   Trouble walking:  feels his balance is getting better over the last 1 week, but prior to that every afternoon he was feeling more unsteady prior to his 3 pm medication. Denies shuffling, but can't walk a long distance due to exhaustion/pain in legs. Does not have difficulty initiating walking. Does experience some festination in tight spots, can;t slow down.   Freezing of gait:  Occasionally  Balance problems:  Occasionally off balance  Falls:  denies  Changes in speech:  soft and quieter but stable  Swallowing difficulties:  no issues.   Activities of daily living (buttoning  "clothes, bathing, cutting food, etc):  help w dressing makes it go smoother, he can still do it, but getting more difficult. Bathes independently. Feeds himself but does not prepare food.     Review of Non-Motor symptoms:  Cognition:  Memory- good, no cognitive decline. 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 No thoughts of self harm.                       Anxiety: at times, if change to routine or going to an event, stays home 99% of time.   Sleep disturbances including REM behavior disorder: okay, has to urinate 2-3 times a night, take a few naps. Any activity tires him out. Sleeps in a recliner, no RBD. Doesn't snore.   Sensory changes (ie, smell or taste):  denies  Gastrointestinal complaints/Constipation:  more so than has been, tries to eat fiber. BM 5-6 times a week. No discomfort.   Urinary retention or frequency:  frequency, a few episodes of incontinence due to urgency. Urinates frequently - every couple hours  Positional lightheadedness and/or syncope:  denies  Excessive saliva/drooling:  denies  Fatigue:  yes, takes a nap in the morning and in the afternoon. Never feels rested. Sleeps 6 hours a night with frequent awakenings for urination, naps are 1.5 hours.        Past medication trials:  Rytary was too costly  Amantadine caused loss of appetite and associated weight loss  IR C/L caused neck tightness like dyskinesias  Ropinirole and Pramipexole - developed dyskinesia on higher doses and he had balance issues and frequent falls.  Modafinil 200 mg- ineffective    Patient Active Problem List   Diagnosis    Benign essential HTN    Benign prostatic hyperplasia with nocturia    Combined hyperlipidemia    Depression with anxiety    Direct inguinal hernia of left side    Dyskinesia due to Parkinson's disease (Multi)    Hypersomnia, organic "    Protein-calorie malnutrition, unspecified severity (Multi)    Other specified depressive episodes    Chronic right-sided low back pain with right-sided sciatica    Complete tear of left rotator cuff    Excessive daytime sleepiness    Impairment of balance    Insomnia    Right maxillary sinusitis    Lumbar radiculopathy    Spinal stenosis of lumbar region with neurogenic claudication    Spondylolisthesis at L5-S1 level    Tremor    Weight loss, unintentional      Past Medical History:   Diagnosis Date    Anxiety     Depression     Elevated prostate specific antigen (PSA)     Elevated prostate specific antigen (PSA)    Hypertension     Olecranon bursitis, left elbow 11/17/2020    Olecranon bursitis of left elbow    Other conditions influencing health status 02/19/2018    Tear of left supraspinatus tendon, subsequent encounter    Parkinson's disease 2013    Personal history of other diseases of the circulatory system     History of hypertension    Personal history of other diseases of the nervous system and sense organs 03/28/2017    History of Bell's palsy    Personal history of other endocrine, nutritional and metabolic disease     History of hypercholesterolemia    Personal history of other mental and behavioral disorders     History of depression      Past Surgical History:   Procedure Laterality Date    MR HEAD ANGIO WO IV CONTRAST  3/14/2015    MR HEAD ANGIO WO IV CONTRAST 3/14/2015 [facility]    MR NECK ANGIO WO IV CONTRAST  3/14/2015    MR NECK ANGIO WO IV CONTRAST 3/14/2015 [facility]    OTHER SURGICAL HISTORY  03/03/2019    Rotator cuff repair      Social History     Socioeconomic History    Marital status:      Spouse name: Not on file    Number of children: Not on file    Years of education: Not on file    Highest education level: Not on file   Occupational History    Not on file   Tobacco Use    Smoking status: Former     Current packs/day: 0.00     Average packs/day: 1  pack/day for 15.0 years (15.0 ttl pk-yrs)     Types: Cigarettes     Quit date: 1997     Years since quittin.4    Smokeless tobacco: Never   Substance and Sexual Activity    Alcohol use: Not Currently    Drug use: Not Currently    Sexual activity: Not Currently     Partners: Female     Birth control/protection: None   Other Topics Concern    Not on file   Social History Narrative    Not on file     Social Drivers of Health     Financial Resource Strain: Not on file   Food Insecurity: Not on file   Transportation Needs: Not on file   Physical Activity: Not on file   Stress: Not on file   Social Connections: Not on file   Intimate Partner Violence: Not on file   Housing Stability: Not on file      Family History   Problem Relation Name Age of Onset    Cancer Father Nikko     Cancer Father Nikko                  Review of Systems  All other system have been reviewed and are negative for complaint.  Objective   Vitals:    25 0903   BP: 135/78   Pulse: 88   Resp: 18   Temp: 36.2 °C (97.1 °F)        Neurological Exam      MDS UPDRS 1st Score: Motor Examination  Is the patient on medication for treating the symptoms of Parkinson's Disease?: Yes  Patients receiving medication for treating the symptoms of Parkinson's Disease, jessica the patient's clinical state.: On  Is the patient on Levodopa?: Yes  Minutes since last Levodopa dose: 2.5 hours  Speech: 2  Facial Expression: 3  Rigidty Neck: 2  Rigidty RUE: 3  Rigidity - LUE: 3  Rigidity RLE: 3  Rigidity LLE: 2  Finger Tapping Right Hand: 2  Finger Tapping Left Hand: 3  Hand Movements- Right Hand: 1  Hand Movements- Left Hand: 1  Pronatiaon-Supination Movments - Right Hand: 1  Pronatiaon-Supination Movments Left Hand: 2  Toe Tapping Right Foot: 1  Toe Tapping - Left Foot: 2  Leg Agility - Right Le  Leg Agility - Left leg: 3  Arising from Chair: 2  Gait: 2  Freezing of Gait: 0  Postural Stability: 2  Posture: 2  Global Spontanteity of Movment ( Body  Bradykinesia): 3  Postural Tremor - Right Hand: 2  Postural Tremor - Left hand: 1  Kinetic Tremor - Right hand: 1  Kinetic Tremor - Left hand: 0  Rest Tremor Amplitude - RUE: 2  Rest Tremor Amplitude - LUE: 0  Rest Tremor Amplitude - RLE: 1  Rest Tremor Amplitude - LLE: 0  Rest Tremor Amplitude - Lip/Jaw: 1  Constancy of Rest Tremor: 4  MDS UPDRS Total Score: 59  Were dyskinesias (chorea or dystonia) present during examination?: No                  TSH   Date Value Ref Range Status   11/17/2020 0.95 0.44 - 3.98 mIU/L Final     Comment:      TSH testing is performed using different testing    methodology at [facility] than at other    [facility] hospitals. Direct result comparisons should    only be made within the same method.       Folate   Date Value Ref Range Status   11/17/2020 16.0 >5.0 ng/mL Final     Comment:     Low           <3.4  Borderline 3.4-5.0  Normal        >5.0  .   Biotin interference may cause falsely elevated results.    Patients taking a Biotin dose of up to 5 mg/day should    refrain from taking Biotin for 24 hours before sample    collection. Providers may contact their local laboratory   for further information.             Assessment/Plan       Raghu Gonzalez is a 69 y.o. year old male here for PD follow up. He still has significant tremors, rigidity and bradykinesia and remains under treated.  He feels the Crexont has provided the most benefit of the past C/L medications that he's been on but is still having significant peak  effects on Crexont BID. He is also endorsing worsened fatigue with poor sleep due to urinary frequency overnight. Due to the fact that he remains undertreated, he could benefit from higher dose of levodopa. Will trial increasing C/L to TID then 1 week later start Gocovrii trial to see if this provides improvement in peak side effects from C/L. Discussed various infusion devices as potential future options and provided information to patient to look these  devices up.    Plan:  Increase Crexont  capsule BID (7am, 3pm, 9pm)  1 week later, trial Gocovrii 1 tab at 5pm, samples previously provided. Can continue if has reduction in peak dose effect of Crexont.  Consider Vyalev (subcutaneous levodopa infusion) or Onapgo (apomorphine infusion) device in the future  Continue to exercise as much as you can    Follow-up with Theron Chen CNP in 3 months, and Dr. Castillo in 6 months.    Cielo Stein MD  PGY-4 Neurology        I saw and evaluated the patient. I personally obtained the key and critical portions of the history and physical exam or was physically present for key and critical portions performed by the resident/fellow. I reviewed the resident/fellow's documentation and discussed the patient with the resident/fellow. I agree with the resident/fellow's medical decision making as documented in the note.    Nel Castillo MD         For the Evaluation and Management of this patient, the level of Medical Decision Making for this visit was determined based on the following:    The level of COMPLEXITY AND NUMBER OF PROBLEMS ADDRESSED was [, MODERATE] as determined by:     MODERATE:    one chronic illnesses with exacerbation, progression or side effect of Rx.      The AMOUNT/COMPLEXITY OF DATA TO REVIEW (reviewed, ordered or call for) was [, LIMITED] as determined by:    LIMITED:  my review of prior external notes from a unique source.      The level of RISK OF COMPLICATIONS was [, MODERATE] as determined by:    MODERATE:  prescription drug management.    Thus, the level of medical decision making (based on the lower of the two highest elements) was determined to be [MODERATE, ]. Therefore the appropriate E/M code for this encounter is /12522,     This is a chronic neurologic condition that requires ongoing care and monitoring. This is a complex, serious condition that needs long term care going forward. Between myself and the patient we will be changing  direction of care depending on responses to treatment.   Today we discussed medication options, non medication options for management and various other symptoms that are in relation to this disease.  I will continue to be involved in the care of this patient.

## 2025-06-26 NOTE — LETTER
June 26, 2025     Hair George MD  [address] [facility] [address]  [facility] [address]    Patient: Raghu Gonzalez   YOB: 1955   Date of Visit: 6/26/2025       Dear Dr. Hair George MD:    Thank you for referring Raghu Gonzalez to me for evaluation. Below are my notes for this consultation.  If you have questions, please do not hesitate to call me. I look forward to following your patient along with you.       Sincerely,     Nel Castillo MD      CC: No Recipients  ______________________________________________________________________________________    Subjective    Raghu Gonzalez is a right handed  69 y.o. year old male who presents with Parkinson's Disease. Patient is accompanied by: spouse  Visit type: follow up visit     Last visit was 11/2024 with Theron Chen at which time Rytary was stopped and patient was started on Crexont and prescribed [facility] physical therapy.  Levodopa response test without improvement in symptoms in July 2024.    Current issues: Today presents with his wife. States the tremors are still his biggest concern. Feels the Crexont has been the best medication he has been on in terms of how he has tolerated it, but he still finds it worsens his tremors and rigidity in the first couple hours.    Current PD meds:  Crexont  mg IR, 1 tablet twice daily (7am - 3pm). Within the first 2 hours of taking the medication, his tremors and rigidity worsens, but after that it improves. He feels the crexont is the best medication he has been on, but still leaves something to be desired.     He wakes up feeling great after not having taken any medication since 3 pm the day prior. If he doesn't take his Crexont in the morning, he'll begin to have worsening tremor.    Review of Motor symptoms:  Tremor:  Location- R sided                 Rest/postural/action- rest, more so than prior  Stiffness/rigidity: Experiences 30 minutes after taking C/L  Slowness:  Right sided mostly.  "  Trouble walking:  feels his balance is getting better over the last 1 week, but prior to that every afternoon he was feeling more unsteady prior to his 3 pm medication. Denies shuffling, but can't walk a long distance due to exhaustion/pain in legs. Does not have difficulty initiating walking. Does experience some festination in tight spots, can;t slow down.   Freezing of gait:  Occasionally  Balance problems:  Occasionally off balance  Falls:  denies  Changes in speech:  soft and quieter but stable  Swallowing difficulties:  no issues.   Activities of daily living (buttoning clothes, bathing, cutting food, etc):  help w dressing makes it go smoother, he can still do it, but getting more difficult. Bathes independently. Feeds himself but does not prepare food.     Review of Non-Motor symptoms:  Cognition:  Memory- good, no cognitive decline. 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 No thoughts of self harm.                       Anxiety: at times, if change to routine or going to an event, stays home 99% of time.   Sleep disturbances including REM behavior disorder: okay, has to urinate 2-3 times a night, take a few naps. Any activity tires him out. Sleeps in a recliner, no RBD. Doesn't snore.   Sensory changes (ie, smell or taste):  denies  Gastrointestinal complaints/Constipation:  more so than has been, tries to eat fiber. BM 5-6 times a week. No discomfort.   Urinary retention or frequency:  frequency, a few episodes of incontinence due to urgency. Urinates frequently - every couple hours  Positional lightheadedness and/or syncope:  denies  Excessive saliva/drooling:  denies  Fatigue:  yes, takes a nap in the morning and in the afternoon. Never feels rested. Sleeps 6 hours a night with frequent awakenings for urination, naps are 1.5 hours.   "      Past medication trials:  Rytary was too costly  Amantadine caused loss of appetite and associated weight loss  IR C/L caused neck tightness like dyskinesias  Ropinirole and Pramipexole - developed dyskinesia on higher doses and he had balance issues and frequent falls.  Modafinil 200 mg- ineffective    Patient Active Problem List   Diagnosis   • Benign essential HTN   • Benign prostatic hyperplasia with nocturia   • Combined hyperlipidemia   • Depression with anxiety   • Direct inguinal hernia of left side   • Dyskinesia due to Parkinson's disease (Multi)   • Hypersomnia, organic   • Protein-calorie malnutrition, unspecified severity (Multi)   • Other specified depressive episodes   • Chronic right-sided low back pain with right-sided sciatica   • Complete tear of left rotator cuff   • Excessive daytime sleepiness   • Impairment of balance   • Insomnia   • Right maxillary sinusitis   • Lumbar radiculopathy   • Spinal stenosis of lumbar region with neurogenic claudication   • Spondylolisthesis at L5-S1 level   • Tremor   • Weight loss, unintentional      Past Medical History:   Diagnosis Date   • Anxiety    • Depression    • Elevated prostate specific antigen (PSA)     Elevated prostate specific antigen (PSA)   • Hypertension    • Olecranon bursitis, left elbow 11/17/2020    Olecranon bursitis of left elbow   • Other conditions influencing health status 02/19/2018    Tear of left supraspinatus tendon, subsequent encounter   • Parkinson's disease 2013   • Personal history of other diseases of the circulatory system     History of hypertension   • Personal history of other diseases of the nervous system and sense organs 03/28/2017    History of Bell's palsy   • Personal history of other endocrine, nutritional and metabolic disease     History of hypercholesterolemia   • Personal history of other mental and behavioral disorders     History of depression      Past Surgical History:   Procedure Laterality Date   •   HEAD ANGIO WO IV CONTRAST  3/14/2015    MR HEAD ANGIO WO IV CONTRAST 3/14/2015 [facility] ANCILLARY LEGACY   • MR NECK ANGIO WO IV CONTRAST  3/14/2015    MR NECK ANGIO WO IV CONTRAST 3/14/2015 [facility] ANCILLARY LEGACY   • OTHER SURGICAL HISTORY  2019    Rotator cuff repair      Social History     Socioeconomic History   • Marital status:      Spouse name: Not on file   • Number of children: Not on file   • Years of education: Not on file   • Highest education level: Not on file   Occupational History   • Not on file   Tobacco Use   • Smoking status: Former     Current packs/day: 0.00     Average packs/day: 1 pack/day for 15.0 years (15.0 ttl pk-yrs)     Types: Cigarettes     Quit date: 1997     Years since quittin.4   • Smokeless tobacco: Never   Substance and Sexual Activity   • Alcohol use: Not Currently   • Drug use: Not Currently   • Sexual activity: Not Currently     Partners: Female     Birth control/protection: None   Other Topics Concern   • Not on file   Social History Narrative   • Not on file     Social Drivers of Health     Financial Resource Strain: Not on file   Food Insecurity: Not on file   Transportation Needs: Not on file   Physical Activity: Not on file   Stress: Not on file   Social Connections: Not on file   Intimate Partner Violence: Not on file   Housing Stability: Not on file      Family History   Problem Relation Name Age of Onset   • Cancer Father Nikko    • Cancer Father Nikko                  Review of Systems  All other system have been reviewed and are negative for complaint.  Objective  Vitals:    25 0903   BP: 135/78   Pulse: 88   Resp: 18   Temp: 36.2 °C (97.1 °F)        Neurological Exam      MDS UPDRS 1st Score: Motor Examination  Is the patient on medication for treating the symptoms of Parkinson's Disease?: Yes  Patients receiving medication for treating the symptoms of Parkinson's Disease, jessica the patient's clinical state.: On  Is the patient on Levodopa?:  Yes  Minutes since last Levodopa dose: 2.5 hours  Speech: 2  Facial Expression: 3  Rigidty Neck: 2  Rigidty RUE: 3  Rigidity - LUE: 3  Rigidity RLE: 3  Rigidity LLE: 2  Finger Tapping Right Hand: 2  Finger Tapping Left Hand: 3  Hand Movements- Right Hand: 1  Hand Movements- Left Hand: 1  Pronatiaon-Supination Movments - Right Hand: 1  Pronatiaon-Supination Movments Left Hand: 2  Toe Tapping Right Foot: 1  Toe Tapping - Left Foot: 2  Leg Agility - Right Le  Leg Agility - Left leg: 3  Arising from Chair: 2  Gait: 2  Freezing of Gait: 0  Postural Stability: 2  Posture: 2  Global Spontanteity of Movment ( Body Bradykinesia): 3  Postural Tremor - Right Hand: 2  Postural Tremor - Left hand: 1  Kinetic Tremor - Right hand: 1  Kinetic Tremor - Left hand: 0  Rest Tremor Amplitude - RUE: 2  Rest Tremor Amplitude - LUE: 0  Rest Tremor Amplitude - RLE: 1  Rest Tremor Amplitude - LLE: 0  Rest Tremor Amplitude - Lip/Jaw: 1  Constancy of Rest Tremor: 4  MDS UPDRS Total Score: 59  Were dyskinesias (chorea or dystonia) present during examination?: No                  TSH   Date Value Ref Range Status   2020 0.95 0.44 - 3.98 mIU/L Final     Comment:      TSH testing is performed using different testing    methodology at [facility] than at other    [facility]. Direct result comparisons should    only be made within the same method.       Folate   Date Value Ref Range Status   2020 16.0 >5.0 ng/mL Final     Comment:     Low           <3.4  Borderline 3.4-5.0  Normal        >5.0  .   Biotin interference may cause falsely elevated results.    Patients taking a Biotin dose of up to 5 mg/day should    refrain from taking Biotin for 24 hours before sample    collection. Providers may contact their local laboratory   for further information.             Assessment/Plan      Raghu Gonzalez is a 69 y.o. year old male here for PD follow up. He still has significant tremors, rigidity and bradykinesia and  remains under treated.  He feels the Crexont has provided the most benefit of the past C/L medications that he's been on but is still having significant peak  effects on Crexont BID. He is also endorsing worsened fatigue with poor sleep due to urinary frequency overnight. Due to the fact that he remains undertreated, he could benefit from higher dose of levodopa. Will trial increasing C/L to TID then 1 week later start Gocovrii trial to see if this provides improvement in peak side effects from C/L. Discussed various infusion devices as potential future options and provided information to patient to look these devices up.    Plan:  Increase Crexont  capsule BID (7am, 3pm, 9pm)  1 week later, trial Gocovrii 1 tab at 5pm, samples previously provided. Can continue if has reduction in peak dose effect of Crexont.  Consider Vyalev (subcutaneous levodopa infusion) or Onapgo (apomorphine infusion) device in the future  Continue to exercise as much as you can    Follow-up with Theron Chen CNP in 3 months, and Dr. Castillo in 6 months.    Cielo Stein MD  PGY-4 Neurology        I saw and evaluated the patient. I personally obtained the key and critical portions of the history and physical exam or was physically present for key and critical portions performed by the resident/fellow. I reviewed the resident/fellow's documentation and discussed the patient with the resident/fellow. I agree with the resident/fellow's medical decision making as documented in the note.    Nel Castillo MD         For the Evaluation and Management of this patient, the level of Medical Decision Making for this visit was determined based on the following:    The level of COMPLEXITY AND NUMBER OF PROBLEMS ADDRESSED was [, MODERATE] as determined by:     MODERATE:    one chronic illnesses with exacerbation, progression or side effect of Rx.      The AMOUNT/COMPLEXITY OF DATA TO REVIEW (reviewed, ordered or call for) was [, LIMITED] as  determined by:    LIMITED:  my review of prior external notes from a unique source.      The level of RISK OF COMPLICATIONS was [, MODERATE] as determined by:    MODERATE:  prescription drug management.    Thus, the level of medical decision making (based on the lower of the two highest elements) was determined to be [MODERATE, ]. Therefore the appropriate E/M code for this encounter is /87089,     This is a chronic neurologic condition that requires ongoing care and monitoring. This is a complex, serious condition that needs long term care going forward. Between myself and the patient we will be changing direction of care depending on responses to treatment.   Today we discussed medication options, non medication options for management and various other symptoms that are in relation to this disease.  I will continue to be involved in the care of this patient.

## 2025-07-16 ENCOUNTER — PATIENT MESSAGE (OUTPATIENT)
Dept: NEUROLOGY | Facility: CLINIC | Age: 70
End: 2025-07-16
Payer: MEDICARE

## 2025-07-16 DIAGNOSIS — G20.B1 PARKINSON'S DISEASE WITH DYSKINESIA, UNSPECIFIED WHETHER MANIFESTATIONS FLUCTUATE: Primary | ICD-10-CM

## 2025-07-17 ENCOUNTER — SPECIALTY PHARMACY (OUTPATIENT)
Dept: PHARMACY | Facility: CLINIC | Age: 70
End: 2025-07-17

## 2025-07-17 DIAGNOSIS — G20.B1 PARKINSON'S DISEASE WITH DYSKINESIA, UNSPECIFIED WHETHER MANIFESTATIONS FLUCTUATE: Primary | ICD-10-CM

## 2025-07-17 RX ORDER — AMANTADINE HYDROCHLORIDE 100 MG/1
TABLET ORAL
Qty: 60 TABLET | Refills: 2 | Status: SHIPPED | OUTPATIENT
Start: 2025-07-17

## 2025-07-17 RX ORDER — AMANTADINE 137 MG/1
2 CAPSULE, COATED PELLETS ORAL NIGHTLY
Qty: 60 CAPSULE | Refills: 5 | Status: SHIPPED | OUTPATIENT
Start: 2025-07-17

## 2025-07-18 ENCOUNTER — APPOINTMENT (OUTPATIENT)
Dept: SURGICAL ONCOLOGY | Facility: HOSPITAL | Age: 70
End: 2025-07-18
Payer: MEDICARE

## 2025-07-22 DIAGNOSIS — F41.8 DEPRESSION WITH ANXIETY: ICD-10-CM

## 2025-07-22 RX ORDER — BUPROPION HYDROCHLORIDE 150 MG/1
150 TABLET ORAL DAILY
Qty: 90 TABLET | Refills: 0 | Status: SHIPPED | OUTPATIENT
Start: 2025-07-22

## 2025-08-06 DIAGNOSIS — G20.B1 PARKINSON'S DISEASE WITH DYSKINESIA, UNSPECIFIED WHETHER MANIFESTATIONS FLUCTUATE: ICD-10-CM

## 2025-08-06 RX ORDER — AMANTADINE 137 MG/1
2 CAPSULE, COATED PELLETS ORAL NIGHTLY
Qty: 60 CAPSULE | Refills: 5 | Status: SHIPPED | OUTPATIENT
Start: 2025-08-06

## 2025-08-08 ENCOUNTER — PROCEDURE VISIT (OUTPATIENT)
Dept: SURGICAL ONCOLOGY | Facility: HOSPITAL | Age: 70
End: 2025-08-08
Payer: MEDICARE

## 2025-08-08 VITALS
HEART RATE: 72 BPM | WEIGHT: 114.6 LBS | SYSTOLIC BLOOD PRESSURE: 142 MMHG | TEMPERATURE: 97.5 F | BODY MASS INDEX: 19.67 KG/M2 | OXYGEN SATURATION: 100 % | RESPIRATION RATE: 16 BRPM | DIASTOLIC BLOOD PRESSURE: 77 MMHG

## 2025-08-08 DIAGNOSIS — C43.9 MELANOMA OF SKIN (MULTI): Primary | ICD-10-CM

## 2025-08-08 PROCEDURE — 99215 OFFICE O/P EST HI 40 MIN: CPT | Performed by: SURGERY

## 2025-08-08 PROCEDURE — 13122 CMPLX RPR S/A/L ADDL 5 CM/>: CPT | Performed by: SURGERY

## 2025-08-08 PROCEDURE — 99215 OFFICE O/P EST HI 40 MIN: CPT | Mod: 57 | Performed by: SURGERY

## 2025-08-08 PROCEDURE — 13121 CMPLX RPR S/A/L 2.6-7.5 CM: CPT | Performed by: SURGERY

## 2025-08-08 PROCEDURE — 11603 EXC TR-EXT MAL+MARG 2.1-3 CM: CPT | Performed by: SURGERY

## 2025-08-08 ASSESSMENT — PAIN SCALES - GENERAL: PAINLEVEL_OUTOF10: 0-NO PAIN

## 2025-08-08 NOTE — PROGRESS NOTES
Procedure visit    Referring Provider:  No ref. provider found   Hair George MD    Chief Complaint:  No chief complaint on file.  Left forearm melanoma    History of Present Illness:  This is a 69 y.o. 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 He had an unusual irregularly pigmented skin lesion in this area that was biopsied with a shave biopsy targeting the central and darkest portion of the lesion. This had positive margins with MIS on the peripheral margins. Lentigo maligna subtype    The patient has had Parkinson's disease for 13 years that he has managed well.     Punch biopsies performed at our initial visit for surrounding skin changes concerning for possible melanoma in situ.  The pathology returned as benign.  As such the patient was recommended for wide excision with a 1 cm margin and consideration of sentinel lymph node biopsy.  He declined sentinel node biopsy and preferred wide excision in the clinic.  This has been pushed back for various reasons per the patient's preference.  FINAL DIAGNOSIS   A: SKIN, L FOREARM RADIAL, PUNCH BIOPSY:  ACTINIC DAMAGE WITH EPIDERMAL ATROPHY AND MILD MELANOCYTE HYPERPLASIA, SEE NOTE.     Note: Microscopic examination reveals a specimen that extends into the deep reticular dermis. There is moderate solar elastosis with epidermal atrophy and mild basal layer melanin pigmentation. Multiple step sections were performed. A SOX-10 stain reveals a mild increase in single melanocytes with small nuclei along the dermal-epidermal junction. All control slides stain appropriately.     These findings are not specific.     B: SKIN, L FOREARM ULNAR, PUNCH BIOPSY:  MELANOCYTE HYPERPLASIA WITH ACTINIC DAMAGE, SEE NOTE.     Note: Microscopic examination reveals a specimen that extends into the deep reticular dermis. There is dense solar elastosis with mild to moderate basal layer melanin pigmentation and flattening of the  epidermis with atrophy. Multiple step sections were performed. A SOX-10 stain reveals a moderate increase in single melanocytes along the dermal-epidermal junction with rare pagetoid extension. All control slides stain appropriately.     These findings are not specific. Actinic melanocytosis cannot be excluded.       8/8/2025-presenting for wide excision of the left arm melanoma. No acute medical concerns    Past Medical History:  Past Medical History:  No date: Anxiety  No date: Depression  No date: Elevated prostate specific antigen (PSA)      Comment:  Elevated prostate specific antigen (PSA)  2023: Hernia, internal  No date: Hypertension  2025: Melanoma (Multi)  11/17/2020: Olecranon bursitis, left elbow      Comment:  Olecranon bursitis of left elbow  02/19/2018: Other conditions influencing health status      Comment:  Tear of left supraspinatus tendon, subsequent encounter  2013: Parkinson's disease  No date: Personal history of other diseases of the circulatory system      Comment:  History of hypertension  03/28/2017: Personal history of other diseases of the nervous system   and sense organs      Comment:  History of Bell's palsy  No date: Personal history of other endocrine, nutritional and   metabolic disease      Comment:  History of hypercholesterolemia  No date: Personal history of other mental and behavioral disorders      Comment:  History of depression     Past Surgical History:  Past Surgical History:  2024: HERNIA REPAIR  3/14/2015: MR HEAD ANGIO WO IV CONTRAST      Comment:  MR HEAD ANGIO WO IV CONTRAST 3/14/2015 [facility]CILL[facility]                LEGACY  3/14/2015: MR NECK ANGIO WO IV CONTRAST      Comment:  MR NECK ANGIO WO IV CONTRAST 3/14/2015 [facility]                LEGACY  03/03/2019: OTHER SURGICAL HISTORY      Comment:  Rotator cuff repair     Medications:  Current Outpatient Medications   Medication Instructions    amantadine (Symmetrel) 100 mg tablet Week 1: Take 1 tab daily-am. Week 2:  Take 1 tab 2 times a day-am and afternoon and continue this dose    amantadine HCL (Gocovri) 137 mg capsule,extended release 24hr 2 capsules, oral, Nightly    atenolol (TENORMIN) 50 mg, oral, Daily    buPROPion XL (WELLBUTRIN XL) 150 mg, oral, Daily    carbidopa-levodopa (Crexont)  mg capsule,IR -extend rel,biphase 1 capsule, oral, 3 times daily    fluticasone (Flonase) 50 mcg/actuation nasal spray 1 spray, Each Nostril, Daily, Shake gently. Before first use, prime pump. After use, clean tip and replace cap.    sertraline (Zoloft) 50 mg tablet Take half tablet daily for 2 weeks, then one tablet daily.    sertraline (ZOLOFT) 100 mg, oral, Daily RT    simvastatin (ZOCOR) 10 mg, oral, Nightly    traZODone (DESYREL) 50 mg, oral, Nightly PRN        Allergies:  RX Allergies[1]     Family History:  Father with Lung Cancer    Social History:   reports that he quit smoking about 28 years ago. His smoking use included cigarettes. He has a 15 pack-year smoking history. He has never used smokeless tobacco. He reports that he does not currently use alcohol. He reports that he does not currently use drugs.   Lives in [place] with his wife    Review of Systems:  A complete 12 point review of systems was performed and is negative except as noted in the history of present illness.    Vital Signs:  Vitals:    08/08/25 0801   BP: 142/77   Pulse: 72   Resp: 16   Temp: 36.4 °C (97.5 °F)   SpO2: 100%      Physical Exam:  GEN: No acute distress, Healthy appearing  HEENT: Moist mucus membranes, normocephalic  CARDS: RRR  PULM: No respiratory distress  GI: Soft, non-distended  LYMPH: No palpable lymphadenopathy in the left epitrochlear and axillary basins  SKIN: left proximal dorsal forearm with a biopsy site within a pigmented patch with additional patterns of pigmentation encircling the index lesion up to 1cm away (biopsy negative for malignancy at the surrounding pigment)  NEURO: Parkinsonian tremor  EXT: No arm or leg  "swelling    Laboratory Values:  Lab Results   Component Value Date    WBC 7.7 12/09/2024    HGB 14.0 12/09/2024    HCT 43.2 12/09/2024    MCV 92 12/09/2024     12/09/2024        Chemistry    Lab Results   Component Value Date/Time     12/09/2024 0838    K 4.4 12/09/2024 0838     12/09/2024 0838    CO2 31 12/09/2024 0838    BUN 22 12/09/2024 0838    CREATININE 1.14 12/09/2024 0838    Lab Results   Component Value Date/Time    CALCIUM 9.5 12/09/2024 0838    ALKPHOS 61 12/09/2024 0838    AST 23 12/09/2024 0838    ALT 33 12/09/2024 0838    BILITOT 0.5 12/09/2024 0838           No results found for: \"PR1\"    PROCEDURE:   After obtaining informed consent, the patient was brought to the procedure room. A 1cm margin was marked around the left forearm biopsy site and an ellipse was drawn resulting in a 3.0x9.0cm marking. The skin was incised sharply and dissection carried to but not through the underlying muscular fascia. The specimen was marked with a short stitch proximally at 12:00, and a long stitch volar at 9:00. Skin and subcutaneous flaps were raised minimally in all directions. The wound was irrigated and hemostasis was obtained. The wound was closed in layers with 3-0 vicryl for the deep dermal layer, and 4-0 monocryl running stitch for the skin. Skin glue was used as a dressing.       Assessment:  This is a 69 y.o. 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 No clinically palpable lymphadenectomy. Biopsy of surrounding pigmentation will help surgical planning. He is recommended for Wide excision with a 1cm margin and consideration of SLNB. The patient had scouting biopsies of surrounding skin changes that did not show melanoma. As a result the patient desired to pursue wide excision alone in the clinic.  The patient desired to push the procedure back until a time that worked for him.     Plan:  -- Follow up excision pathology.   -- RTC in 2 " weeks for stitch removal  -- The patient asked very appropriate questions that were answered to the best of my ability with the current information at hand. He knows to call with any questions or concerns that arise        Shan Ingram MD, MPH         [1] No Known Allergies

## 2025-08-14 LAB
LAB AP ASR DISCLAIMER: NORMAL
LABORATORY COMMENT REPORT: NORMAL
PATH REPORT.FINAL DX SPEC: NORMAL
PATH REPORT.GROSS SPEC: NORMAL
PATH REPORT.RELEVANT HX SPEC: NORMAL
PATH REPORT.TOTAL CANCER: NORMAL
PATHOLOGY SYNOPTIC REPORT: NORMAL

## 2025-08-15 DIAGNOSIS — C43.9 MELANOMA OF SKIN (MULTI): Primary | ICD-10-CM

## 2025-08-18 ENCOUNTER — TUMOR BOARD CONFERENCE (OUTPATIENT)
Dept: HEMATOLOGY/ONCOLOGY | Facility: HOSPITAL | Age: 70
End: 2025-08-18
Payer: MEDICARE

## 2025-08-26 ENCOUNTER — APPOINTMENT (OUTPATIENT)
Dept: SURGICAL ONCOLOGY | Facility: CLINIC | Age: 70
End: 2025-08-26
Payer: MEDICARE

## 2025-08-29 ENCOUNTER — PROCEDURE VISIT (OUTPATIENT)
Dept: SURGICAL ONCOLOGY | Facility: HOSPITAL | Age: 70
End: 2025-08-29
Payer: MEDICARE

## 2025-08-29 VITALS
BODY MASS INDEX: 19.53 KG/M2 | OXYGEN SATURATION: 98 % | RESPIRATION RATE: 16 BRPM | SYSTOLIC BLOOD PRESSURE: 119 MMHG | TEMPERATURE: 97.7 F | WEIGHT: 113.8 LBS | HEART RATE: 76 BPM | DIASTOLIC BLOOD PRESSURE: 71 MMHG

## 2025-08-29 DIAGNOSIS — C43.9 MELANOMA OF SKIN (MULTI): Primary | ICD-10-CM

## 2025-08-29 PROCEDURE — 99215 OFFICE O/P EST HI 40 MIN: CPT | Performed by: SURGERY

## 2025-08-29 PROCEDURE — 12034 INTMD RPR S/TR/EXT 7.6-12.5: CPT | Performed by: SURGERY

## 2025-08-29 PROCEDURE — 11603 EXC TR-EXT MAL+MARG 2.1-3 CM: CPT | Performed by: SURGERY

## 2025-08-29 ASSESSMENT — PAIN SCALES - GENERAL: PAINLEVEL_OUTOF10: 0-NO PAIN

## 2025-09-03 LAB
LABORATORY COMMENT REPORT: NORMAL
PATH REPORT.FINAL DX SPEC: NORMAL
PATH REPORT.GROSS SPEC: NORMAL
PATH REPORT.MICROSCOPIC SPEC OTHER STN: NORMAL
PATH REPORT.RELEVANT HX SPEC: NORMAL
PATH REPORT.TOTAL CANCER: NORMAL

## 2025-09-05 ENCOUNTER — OFFICE VISIT (OUTPATIENT)
Dept: UROLOGY | Facility: HOSPITAL | Age: 70
End: 2025-09-05
Payer: MEDICARE

## 2025-09-05 DIAGNOSIS — N40.1 BENIGN PROSTATIC HYPERPLASIA WITH NOCTURIA: Primary | ICD-10-CM

## 2025-09-05 DIAGNOSIS — N32.81 OAB (OVERACTIVE BLADDER): ICD-10-CM

## 2025-09-05 DIAGNOSIS — R35.1 BENIGN PROSTATIC HYPERPLASIA WITH NOCTURIA: Primary | ICD-10-CM

## 2025-09-05 PROCEDURE — 1159F MED LIST DOCD IN RCRD: CPT | Performed by: NURSE PRACTITIONER

## 2025-09-05 PROCEDURE — 1160F RVW MEDS BY RX/DR IN RCRD: CPT | Performed by: NURSE PRACTITIONER

## 2025-09-05 PROCEDURE — 1036F TOBACCO NON-USER: CPT | Performed by: NURSE PRACTITIONER

## 2025-09-05 PROCEDURE — 51798 US URINE CAPACITY MEASURE: CPT | Performed by: NURSE PRACTITIONER

## 2025-09-05 PROCEDURE — 1126F AMNT PAIN NOTED NONE PRSNT: CPT | Performed by: NURSE PRACTITIONER

## 2025-09-05 PROCEDURE — G2211 COMPLEX E/M VISIT ADD ON: HCPCS | Performed by: NURSE PRACTITIONER

## 2025-09-05 PROCEDURE — 99204 OFFICE O/P NEW MOD 45 MIN: CPT | Performed by: NURSE PRACTITIONER

## 2025-09-05 RX ORDER — TOLTERODINE 2 MG/1
2 CAPSULE, EXTENDED RELEASE ORAL DAILY
Qty: 30 CAPSULE | Refills: 1 | Status: SHIPPED | OUTPATIENT
Start: 2025-09-05 | End: 2025-12-04

## 2025-09-05 ASSESSMENT — PAIN SCALES - GENERAL: PAINLEVEL_OUTOF10: 0-NO PAIN

## 2025-09-15 ENCOUNTER — APPOINTMENT (OUTPATIENT)
Dept: NEUROLOGY | Facility: CLINIC | Age: 70
End: 2025-09-15
Payer: MEDICARE

## 2025-09-24 ENCOUNTER — APPOINTMENT (OUTPATIENT)
Dept: UROLOGY | Facility: HOSPITAL | Age: 70
End: 2025-09-24
Payer: MEDICARE

## 2025-12-09 ENCOUNTER — APPOINTMENT (OUTPATIENT)
Dept: PRIMARY CARE | Facility: CLINIC | Age: 70
End: 2025-12-09
Payer: MEDICARE

## 2026-01-08 ENCOUNTER — APPOINTMENT (OUTPATIENT)
Dept: NEUROLOGY | Facility: HOSPITAL | Age: 71
End: 2026-01-08
Payer: MEDICARE

## 2026-01-15 ENCOUNTER — APPOINTMENT (OUTPATIENT)
Dept: NEUROLOGY | Facility: HOSPITAL | Age: 71
End: 2026-01-15
Payer: MEDICARE